# Patient Record
Sex: FEMALE | Race: BLACK OR AFRICAN AMERICAN | NOT HISPANIC OR LATINO | Employment: OTHER | URBAN - METROPOLITAN AREA
[De-identification: names, ages, dates, MRNs, and addresses within clinical notes are randomized per-mention and may not be internally consistent; named-entity substitution may affect disease eponyms.]

---

## 2017-01-01 ENCOUNTER — TELEPHONE (OUTPATIENT)
Dept: SLEEP MEDICINE | Facility: MEDICAL CENTER | Age: 72
End: 2017-01-01

## 2017-01-01 ENCOUNTER — PATIENT OUTREACH (OUTPATIENT)
Dept: HEALTH INFORMATION MANAGEMENT | Facility: OTHER | Age: 72
End: 2017-01-01

## 2017-01-01 ENCOUNTER — APPOINTMENT (OUTPATIENT)
Dept: RADIOLOGY | Facility: MEDICAL CENTER | Age: 72
DRG: 291 | End: 2017-01-01
Attending: INTERNAL MEDICINE
Payer: MEDICARE

## 2017-01-01 ENCOUNTER — TELEPHONE (OUTPATIENT)
Dept: MEDICAL GROUP | Facility: CLINIC | Age: 72
End: 2017-01-01

## 2017-01-01 ENCOUNTER — OFFICE VISIT (OUTPATIENT)
Dept: PULMONOLOGY | Facility: HOSPICE | Age: 72
End: 2017-01-01
Payer: MEDICARE

## 2017-01-01 ENCOUNTER — APPOINTMENT (OUTPATIENT)
Dept: RADIOLOGY | Facility: MEDICAL CENTER | Age: 72
DRG: 291 | End: 2017-01-01
Attending: EMERGENCY MEDICINE
Payer: MEDICARE

## 2017-01-01 ENCOUNTER — OFFICE VISIT (OUTPATIENT)
Dept: MEDICAL GROUP | Facility: CLINIC | Age: 72
End: 2017-01-01
Payer: MEDICARE

## 2017-01-01 ENCOUNTER — HOSPITAL ENCOUNTER (EMERGENCY)
Facility: MEDICAL CENTER | Age: 72
End: 2017-03-14
Attending: EMERGENCY MEDICINE
Payer: MEDICARE

## 2017-01-01 ENCOUNTER — TELEPHONE (OUTPATIENT)
Dept: CARDIOLOGY | Facility: MEDICAL CENTER | Age: 72
End: 2017-01-01

## 2017-01-01 ENCOUNTER — APPOINTMENT (OUTPATIENT)
Dept: PULMONOLOGY | Facility: HOSPICE | Age: 72
End: 2017-01-01
Payer: MEDICARE

## 2017-01-01 ENCOUNTER — TELEPHONE (OUTPATIENT)
Dept: PULMONOLOGY | Facility: HOSPICE | Age: 72
End: 2017-01-01

## 2017-01-01 ENCOUNTER — HOSPITAL ENCOUNTER (INPATIENT)
Facility: MEDICAL CENTER | Age: 72
LOS: 5 days | DRG: 291 | End: 2017-12-30
Attending: EMERGENCY MEDICINE | Admitting: HOSPITALIST
Payer: MEDICARE

## 2017-01-01 ENCOUNTER — HOME CARE VISIT (OUTPATIENT)
Dept: HOSPICE | Facility: HOSPICE | Age: 72
End: 2017-01-01
Payer: MEDICARE

## 2017-01-01 ENCOUNTER — HOSPICE ADMISSION (OUTPATIENT)
Dept: HOSPICE | Facility: HOSPICE | Age: 72
End: 2017-01-01
Payer: MEDICARE

## 2017-01-01 ENCOUNTER — RESOLUTE PROFESSIONAL BILLING HOSPITAL PROF FEE (OUTPATIENT)
Dept: HOSPITALIST | Facility: MEDICAL CENTER | Age: 72
End: 2017-01-01
Payer: MEDICARE

## 2017-01-01 ENCOUNTER — HOSPITAL ENCOUNTER (OUTPATIENT)
Dept: LAB | Facility: MEDICAL CENTER | Age: 72
End: 2017-02-28
Attending: PHYSICIAN ASSISTANT
Payer: MEDICARE

## 2017-01-01 ENCOUNTER — HOSPITAL ENCOUNTER (OUTPATIENT)
Dept: LAB | Facility: MEDICAL CENTER | Age: 72
End: 2017-03-22
Attending: PHYSICIAN ASSISTANT
Payer: MEDICARE

## 2017-01-01 ENCOUNTER — HOSPITAL ENCOUNTER (OUTPATIENT)
Facility: MEDICAL CENTER | Age: 72
End: 2017-03-23
Attending: FAMILY MEDICINE
Payer: MEDICARE

## 2017-01-01 ENCOUNTER — HOSPITAL ENCOUNTER (OUTPATIENT)
Dept: LAB | Facility: MEDICAL CENTER | Age: 72
End: 2017-06-01
Attending: FAMILY MEDICINE
Payer: MEDICARE

## 2017-01-01 ENCOUNTER — HOSPITAL ENCOUNTER (OUTPATIENT)
Dept: LAB | Facility: MEDICAL CENTER | Age: 72
End: 2017-04-24
Attending: INTERNAL MEDICINE
Payer: MEDICARE

## 2017-01-01 VITALS
OXYGEN SATURATION: 96 % | DIASTOLIC BLOOD PRESSURE: 90 MMHG | HEART RATE: 70 BPM | TEMPERATURE: 97.2 F | RESPIRATION RATE: 16 BRPM | HEIGHT: 67 IN | SYSTOLIC BLOOD PRESSURE: 140 MMHG

## 2017-01-01 VITALS
TEMPERATURE: 97.2 F | HEIGHT: 67 IN | HEART RATE: 62 BPM | RESPIRATION RATE: 16 BRPM | SYSTOLIC BLOOD PRESSURE: 130 MMHG | BODY MASS INDEX: 45.99 KG/M2 | DIASTOLIC BLOOD PRESSURE: 80 MMHG | OXYGEN SATURATION: 92 % | WEIGHT: 293 LBS

## 2017-01-01 VITALS
HEIGHT: 67 IN | DIASTOLIC BLOOD PRESSURE: 84 MMHG | HEART RATE: 74 BPM | SYSTOLIC BLOOD PRESSURE: 124 MMHG | WEIGHT: 293 LBS | RESPIRATION RATE: 14 BRPM | BODY MASS INDEX: 45.99 KG/M2

## 2017-01-01 VITALS
WEIGHT: 293 LBS | HEART RATE: 80 BPM | OXYGEN SATURATION: 88 % | HEIGHT: 67 IN | SYSTOLIC BLOOD PRESSURE: 126 MMHG | RESPIRATION RATE: 20 BRPM | TEMPERATURE: 97.2 F | DIASTOLIC BLOOD PRESSURE: 80 MMHG | BODY MASS INDEX: 45.99 KG/M2

## 2017-01-01 VITALS
TEMPERATURE: 98.6 F | HEIGHT: 67 IN | BODY MASS INDEX: 45.99 KG/M2 | WEIGHT: 293 LBS | OXYGEN SATURATION: 95 % | DIASTOLIC BLOOD PRESSURE: 71 MMHG | SYSTOLIC BLOOD PRESSURE: 144 MMHG | HEART RATE: 79 BPM | RESPIRATION RATE: 20 BRPM

## 2017-01-01 VITALS
OXYGEN SATURATION: 87 % | HEIGHT: 67 IN | BODY MASS INDEX: 45.99 KG/M2 | SYSTOLIC BLOOD PRESSURE: 130 MMHG | TEMPERATURE: 98.4 F | WEIGHT: 293 LBS | HEART RATE: 90 BPM | DIASTOLIC BLOOD PRESSURE: 80 MMHG | RESPIRATION RATE: 16 BRPM

## 2017-01-01 VITALS
DIASTOLIC BLOOD PRESSURE: 83 MMHG | SYSTOLIC BLOOD PRESSURE: 116 MMHG | WEIGHT: 293 LBS | RESPIRATION RATE: 16 BRPM | OXYGEN SATURATION: 97 % | TEMPERATURE: 97.3 F | HEART RATE: 67 BPM | HEIGHT: 67 IN | BODY MASS INDEX: 45.99 KG/M2

## 2017-01-01 DIAGNOSIS — E66.01 MORBID OBESITY WITH BMI OF 60.0-69.9, ADULT (HCC): ICD-10-CM

## 2017-01-01 DIAGNOSIS — J44.89 COPD WITH ASTHMA: Chronic | ICD-10-CM

## 2017-01-01 DIAGNOSIS — Z99.81 OXYGEN DEPENDENT: ICD-10-CM

## 2017-01-01 DIAGNOSIS — I27.20 PULMONARY HTN (HCC): Chronic | ICD-10-CM

## 2017-01-01 DIAGNOSIS — I50.40 COMBINED SYSTOLIC AND DIASTOLIC CONGESTIVE HEART FAILURE, NYHA CLASS 3 (HCC): ICD-10-CM

## 2017-01-01 DIAGNOSIS — M25.572 CHRONIC PAIN OF BOTH ANKLES: ICD-10-CM

## 2017-01-01 DIAGNOSIS — J44.1 COPD WITH ACUTE EXACERBATION (HCC): ICD-10-CM

## 2017-01-01 DIAGNOSIS — G89.29 CHRONIC PAIN OF BOTH ANKLES: ICD-10-CM

## 2017-01-01 DIAGNOSIS — I10 ESSENTIAL HYPERTENSION: Chronic | ICD-10-CM

## 2017-01-01 DIAGNOSIS — M25.571 CHRONIC PAIN OF BOTH ANKLES: ICD-10-CM

## 2017-01-01 DIAGNOSIS — J96.01 ACUTE HYPOXEMIC RESPIRATORY FAILURE (HCC): ICD-10-CM

## 2017-01-01 DIAGNOSIS — E87.5 HYPERKALEMIA: ICD-10-CM

## 2017-01-01 DIAGNOSIS — R04.0 EPISTAXIS: ICD-10-CM

## 2017-01-01 DIAGNOSIS — I48.20 CHRONIC ATRIAL FIBRILLATION (HCC): Chronic | ICD-10-CM

## 2017-01-01 DIAGNOSIS — G47.33 OBSTRUCTIVE SLEEP APNEA: ICD-10-CM

## 2017-01-01 DIAGNOSIS — I50.9 CONGESTIVE HEART FAILURE, UNSPECIFIED CONGESTIVE HEART FAILURE CHRONICITY, UNSPECIFIED CONGESTIVE HEART FAILURE TYPE: ICD-10-CM

## 2017-01-01 DIAGNOSIS — E66.01 MORBID OBESITY WITH BMI OF 50.0-59.9, ADULT (HCC): ICD-10-CM

## 2017-01-01 DIAGNOSIS — M79.604 BILATERAL LEG AND FOOT PAIN: ICD-10-CM

## 2017-01-01 DIAGNOSIS — M79.672 BILATERAL LEG AND FOOT PAIN: ICD-10-CM

## 2017-01-01 DIAGNOSIS — M79.671 BILATERAL LEG AND FOOT PAIN: ICD-10-CM

## 2017-01-01 DIAGNOSIS — Z12.11 COLON CANCER SCREENING: ICD-10-CM

## 2017-01-01 DIAGNOSIS — J44.9 CHRONIC OBSTRUCTIVE PULMONARY DISEASE, UNSPECIFIED COPD TYPE (HCC): ICD-10-CM

## 2017-01-01 DIAGNOSIS — M79.605 BILATERAL LEG AND FOOT PAIN: ICD-10-CM

## 2017-01-01 DIAGNOSIS — M77.50 BONE SPUR OF FOOT: ICD-10-CM

## 2017-01-01 DIAGNOSIS — Z74.09 IMPAIRED MOBILITY AND ACTIVITIES OF DAILY LIVING: ICD-10-CM

## 2017-01-01 DIAGNOSIS — I10 ESSENTIAL HYPERTENSION: ICD-10-CM

## 2017-01-01 DIAGNOSIS — R06.02 SHORTNESS OF BREATH: ICD-10-CM

## 2017-01-01 DIAGNOSIS — N18.30 CHRONIC KIDNEY DISEASE (CKD), STAGE III (MODERATE) (HCC): Chronic | ICD-10-CM

## 2017-01-01 DIAGNOSIS — Z78.9 IMPAIRED MOBILITY AND ACTIVITIES OF DAILY LIVING: ICD-10-CM

## 2017-01-01 DIAGNOSIS — I50.9 CHRONIC CONGESTIVE HEART FAILURE, UNSPECIFIED CONGESTIVE HEART FAILURE TYPE: ICD-10-CM

## 2017-01-01 DIAGNOSIS — J45.909 UNCOMPLICATED ASTHMA, UNSPECIFIED ASTHMA SEVERITY: ICD-10-CM

## 2017-01-01 DIAGNOSIS — Z23 NEED FOR PNEUMOCOCCAL VACCINATION: ICD-10-CM

## 2017-01-01 DIAGNOSIS — J96.11 CHRONIC RESPIRATORY FAILURE WITH HYPOXIA (HCC): ICD-10-CM

## 2017-01-01 DIAGNOSIS — E78.5 HYPERLIPIDEMIA, UNSPECIFIED HYPERLIPIDEMIA TYPE: Chronic | ICD-10-CM

## 2017-01-01 DIAGNOSIS — Z12.31 ENCOUNTER FOR SCREENING MAMMOGRAM FOR BREAST CANCER: ICD-10-CM

## 2017-01-01 LAB
ALBUMIN SERPL BCP-MCNC: 3.1 G/DL (ref 3.2–4.9)
ALBUMIN SERPL BCP-MCNC: 3.2 G/DL (ref 3.2–4.9)
ALBUMIN SERPL BCP-MCNC: 3.4 G/DL (ref 3.2–4.9)
ALBUMIN/GLOB SERPL: 0.8 G/DL
ALBUMIN/GLOB SERPL: 0.8 G/DL
ALBUMIN/GLOB SERPL: 0.9 G/DL
ALP SERPL-CCNC: 152 U/L (ref 30–99)
ALP SERPL-CCNC: 157 U/L (ref 30–99)
ALP SERPL-CCNC: 164 U/L (ref 30–99)
ALT SERPL-CCNC: 8 U/L (ref 2–50)
ALT SERPL-CCNC: 8 U/L (ref 2–50)
ALT SERPL-CCNC: 9 U/L (ref 2–50)
ANION GAP SERPL CALC-SCNC: 10 MMOL/L (ref 0–11.9)
ANION GAP SERPL CALC-SCNC: 4 MMOL/L (ref 0–11.9)
ANION GAP SERPL CALC-SCNC: 4 MMOL/L (ref 0–11.9)
ANION GAP SERPL CALC-SCNC: 5 MMOL/L (ref 0–11.9)
ANION GAP SERPL CALC-SCNC: 5 MMOL/L (ref 0–11.9)
ANION GAP SERPL CALC-SCNC: 7 MMOL/L (ref 0–11.9)
ANION GAP SERPL CALC-SCNC: 7 MMOL/L (ref 0–11.9)
ANION GAP SERPL CALC-SCNC: 8 MMOL/L (ref 0–11.9)
ANION GAP SERPL CALC-SCNC: 8 MMOL/L (ref 0–11.9)
ANION GAP SERPL CALC-SCNC: 9 MMOL/L (ref 0–11.9)
ANION GAP SERPL CALC-SCNC: 9 MMOL/L (ref 0–11.9)
APTT PPP: 33 SEC (ref 24.7–36)
AST SERPL-CCNC: 21 U/L (ref 12–45)
AST SERPL-CCNC: 23 U/L (ref 12–45)
AST SERPL-CCNC: 24 U/L (ref 12–45)
BACTERIA BLD CULT: NORMAL
BACTERIA BLD CULT: NORMAL
BASE EXCESS BLDA CALC-SCNC: 5 MMOL/L (ref -4–3)
BASE EXCESS BLDA CALC-SCNC: 5 MMOL/L (ref -4–3)
BASOPHILS # BLD AUTO: 0 % (ref 0–1.8)
BASOPHILS # BLD AUTO: 0.3 % (ref 0–1.8)
BASOPHILS # BLD AUTO: 0.4 % (ref 0–1.8)
BASOPHILS # BLD: 0 K/UL (ref 0–0.12)
BASOPHILS # BLD: 0.02 K/UL (ref 0–0.12)
BASOPHILS # BLD: 0.02 K/UL (ref 0–0.12)
BILIRUB SERPL-MCNC: 1.6 MG/DL (ref 0.1–1.5)
BILIRUB SERPL-MCNC: 1.6 MG/DL (ref 0.1–1.5)
BILIRUB SERPL-MCNC: 2.2 MG/DL (ref 0.1–1.5)
BNP SERPL-MCNC: 1188 PG/ML (ref 0–100)
BNP SERPL-MCNC: 2105 PG/ML (ref 0–100)
BNP SERPL-MCNC: 669 PG/ML (ref 0–100)
BNP SERPL-MCNC: 713 PG/ML (ref 0–100)
BNP SERPL-MCNC: 977 PG/ML (ref 0–100)
BODY TEMPERATURE: ABNORMAL CENTIGRADE
BODY TEMPERATURE: ABNORMAL CENTIGRADE
BUN SERPL-MCNC: 36 MG/DL (ref 8–22)
BUN SERPL-MCNC: 37 MG/DL (ref 8–22)
BUN SERPL-MCNC: 38 MG/DL (ref 8–22)
BUN SERPL-MCNC: 40 MG/DL (ref 8–22)
BUN SERPL-MCNC: 44 MG/DL (ref 8–22)
BUN SERPL-MCNC: 48 MG/DL (ref 8–22)
BUN SERPL-MCNC: 48 MG/DL (ref 8–22)
BUN SERPL-MCNC: 54 MG/DL (ref 8–22)
BUN SERPL-MCNC: 59 MG/DL (ref 8–22)
CALCIUM SERPL-MCNC: 10.1 MG/DL (ref 8.5–10.5)
CALCIUM SERPL-MCNC: 10.2 MG/DL (ref 8.5–10.5)
CALCIUM SERPL-MCNC: 10.2 MG/DL (ref 8.5–10.5)
CALCIUM SERPL-MCNC: 10.5 MG/DL (ref 8.5–10.5)
CALCIUM SERPL-MCNC: 10.5 MG/DL (ref 8.5–10.5)
CALCIUM SERPL-MCNC: 9.5 MG/DL (ref 8.5–10.5)
CALCIUM SERPL-MCNC: 9.8 MG/DL (ref 8.5–10.5)
CALCIUM SERPL-MCNC: 9.8 MG/DL (ref 8.5–10.5)
CHLORIDE SERPL-SCNC: 100 MMOL/L (ref 96–112)
CHLORIDE SERPL-SCNC: 102 MMOL/L (ref 96–112)
CHLORIDE SERPL-SCNC: 102 MMOL/L (ref 96–112)
CHLORIDE SERPL-SCNC: 103 MMOL/L (ref 96–112)
CHLORIDE SERPL-SCNC: 103 MMOL/L (ref 96–112)
CHLORIDE SERPL-SCNC: 105 MMOL/L (ref 96–112)
CHLORIDE SERPL-SCNC: 107 MMOL/L (ref 96–112)
CHLORIDE SERPL-SCNC: 99 MMOL/L (ref 96–112)
CHLORIDE SERPL-SCNC: 99 MMOL/L (ref 96–112)
CO2 SERPL-SCNC: 26 MMOL/L (ref 20–33)
CO2 SERPL-SCNC: 27 MMOL/L (ref 20–33)
CO2 SERPL-SCNC: 28 MMOL/L (ref 20–33)
CO2 SERPL-SCNC: 29 MMOL/L (ref 20–33)
CO2 SERPL-SCNC: 29 MMOL/L (ref 20–33)
CO2 SERPL-SCNC: 31 MMOL/L (ref 20–33)
CO2 SERPL-SCNC: 32 MMOL/L (ref 20–33)
CO2 SERPL-SCNC: 32 MMOL/L (ref 20–33)
CO2 SERPL-SCNC: 33 MMOL/L (ref 20–33)
CO2 SERPL-SCNC: 33 MMOL/L (ref 20–33)
CO2 SERPL-SCNC: 34 MMOL/L (ref 20–33)
CREAT SERPL-MCNC: 1.21 MG/DL (ref 0.5–1.4)
CREAT SERPL-MCNC: 1.33 MG/DL (ref 0.5–1.4)
CREAT SERPL-MCNC: 1.45 MG/DL (ref 0.5–1.4)
CREAT SERPL-MCNC: 1.46 MG/DL (ref 0.5–1.4)
CREAT SERPL-MCNC: 1.48 MG/DL (ref 0.5–1.4)
CREAT SERPL-MCNC: 1.52 MG/DL (ref 0.5–1.4)
CREAT SERPL-MCNC: 1.55 MG/DL (ref 0.5–1.4)
CREAT SERPL-MCNC: 1.82 MG/DL (ref 0.5–1.4)
CREAT SERPL-MCNC: 1.91 MG/DL (ref 0.5–1.4)
CREAT SERPL-MCNC: 2.07 MG/DL (ref 0.5–1.4)
CREAT SERPL-MCNC: 2.33 MG/DL (ref 0.5–1.4)
DEPRECATED D DIMER PPP IA-ACNC: 2751 NG/ML(D-DU)
EKG IMPRESSION: NORMAL
EOSINOPHIL # BLD AUTO: 0 K/UL (ref 0–0.51)
EOSINOPHIL # BLD AUTO: 0.18 K/UL (ref 0–0.51)
EOSINOPHIL # BLD AUTO: 0.22 K/UL (ref 0–0.51)
EOSINOPHIL NFR BLD: 0 % (ref 0–6.9)
EOSINOPHIL NFR BLD: 2.9 % (ref 0–6.9)
EOSINOPHIL NFR BLD: 4.6 % (ref 0–6.9)
ERYTHROCYTE [DISTWIDTH] IN BLOOD BY AUTOMATED COUNT: 59.2 FL (ref 35.9–50)
ERYTHROCYTE [DISTWIDTH] IN BLOOD BY AUTOMATED COUNT: 61.4 FL (ref 35.9–50)
ERYTHROCYTE [DISTWIDTH] IN BLOOD BY AUTOMATED COUNT: 63.3 FL (ref 35.9–50)
FLUAV RNA SPEC QL NAA+PROBE: NEGATIVE
FLUBV RNA SPEC QL NAA+PROBE: NEGATIVE
GFR SERPL CREATININE-BSD FRML MDRD: 21 ML/MIN/1.73 M 2
GFR SERPL CREATININE-BSD FRML MDRD: 24 ML/MIN/1.73 M 2
GFR SERPL CREATININE-BSD FRML MDRD: 26 ML/MIN/1.73 M 2
GFR SERPL CREATININE-BSD FRML MDRD: 27 ML/MIN/1.73 M 2
GFR SERPL CREATININE-BSD FRML MDRD: 33 ML/MIN/1.73 M 2
GFR SERPL CREATININE-BSD FRML MDRD: 34 ML/MIN/1.73 M 2
GFR SERPL CREATININE-BSD FRML MDRD: 35 ML/MIN/1.73 M 2
GFR SERPL CREATININE-BSD FRML MDRD: 35 ML/MIN/1.73 M 2
GFR SERPL CREATININE-BSD FRML MDRD: 44 ML/MIN/1.73 M 2
GLOBULIN SER CALC-MCNC: 3.5 G/DL (ref 1.9–3.5)
GLOBULIN SER CALC-MCNC: 4.1 G/DL (ref 1.9–3.5)
GLOBULIN SER CALC-MCNC: 4.4 G/DL (ref 1.9–3.5)
GLUCOSE SERPL-MCNC: 100 MG/DL (ref 65–99)
GLUCOSE SERPL-MCNC: 100 MG/DL (ref 65–99)
GLUCOSE SERPL-MCNC: 104 MG/DL (ref 65–99)
GLUCOSE SERPL-MCNC: 117 MG/DL (ref 65–99)
GLUCOSE SERPL-MCNC: 118 MG/DL (ref 65–99)
GLUCOSE SERPL-MCNC: 125 MG/DL (ref 65–99)
GLUCOSE SERPL-MCNC: 168 MG/DL (ref 65–99)
GLUCOSE SERPL-MCNC: 178 MG/DL (ref 65–99)
GLUCOSE SERPL-MCNC: 82 MG/DL (ref 65–99)
GLUCOSE SERPL-MCNC: 86 MG/DL (ref 65–99)
GLUCOSE SERPL-MCNC: 95 MG/DL (ref 65–99)
HCO3 BLDA-SCNC: 33 MMOL/L (ref 17–25)
HCO3 BLDA-SCNC: 33 MMOL/L (ref 17–25)
HCT VFR BLD AUTO: 39.5 % (ref 37–47)
HCT VFR BLD AUTO: 41.8 % (ref 37–47)
HCT VFR BLD AUTO: 46.3 % (ref 37–47)
HEMOCCULT STL QL IA: NEGATIVE
HGB BLD-MCNC: 12.5 G/DL (ref 12–16)
HGB BLD-MCNC: 13.1 G/DL (ref 12–16)
HGB BLD-MCNC: 14.3 G/DL (ref 12–16)
IMM GRANULOCYTES # BLD AUTO: 0.01 K/UL (ref 0–0.11)
IMM GRANULOCYTES # BLD AUTO: 0.02 K/UL (ref 0–0.11)
IMM GRANULOCYTES # BLD AUTO: 0.02 K/UL (ref 0–0.11)
IMM GRANULOCYTES NFR BLD AUTO: 0.2 % (ref 0–0.9)
IMM GRANULOCYTES NFR BLD AUTO: 0.3 % (ref 0–0.9)
IMM GRANULOCYTES NFR BLD AUTO: 0.5 % (ref 0–0.9)
INHALED O2 FLOW RATE: 15 L/MIN (ref 2–10)
INHALED O2 FLOW RATE: 60 L/MIN (ref 2–10)
INR PPP: 1.32 (ref 0.87–1.13)
LACTATE BLD-SCNC: 1.9 MMOL/L (ref 0.5–2)
LV EJECT FRACT  99904: 55
LV EJECT FRACT MOD 2C 99903: 38.96
LV EJECT FRACT MOD 4C 99902: 58.74
LV EJECT FRACT MOD BP 99901: 51.27
LYMPHOCYTES # BLD AUTO: 0.36 K/UL (ref 1–4.8)
LYMPHOCYTES # BLD AUTO: 0.84 K/UL (ref 1–4.8)
LYMPHOCYTES # BLD AUTO: 0.96 K/UL (ref 1–4.8)
LYMPHOCYTES NFR BLD: 13.4 % (ref 22–41)
LYMPHOCYTES NFR BLD: 19.9 % (ref 22–41)
LYMPHOCYTES NFR BLD: 8.6 % (ref 22–41)
MCH RBC QN AUTO: 29.8 PG (ref 27–33)
MCHC RBC AUTO-ENTMCNC: 30.9 G/DL (ref 33.6–35)
MCHC RBC AUTO-ENTMCNC: 31.3 G/DL (ref 33.6–35)
MCHC RBC AUTO-ENTMCNC: 31.6 G/DL (ref 33.6–35)
MCV RBC AUTO: 94.3 FL (ref 81.4–97.8)
MCV RBC AUTO: 95 FL (ref 81.4–97.8)
MCV RBC AUTO: 96.5 FL (ref 81.4–97.8)
MONOCYTES # BLD AUTO: 0.25 K/UL (ref 0–0.85)
MONOCYTES # BLD AUTO: 0.83 K/UL (ref 0–0.85)
MONOCYTES # BLD AUTO: 0.92 K/UL (ref 0–0.85)
MONOCYTES NFR BLD AUTO: 14.7 % (ref 0–13.4)
MONOCYTES NFR BLD AUTO: 17.2 % (ref 0–13.4)
MONOCYTES NFR BLD AUTO: 5.9 % (ref 0–13.4)
NEUTROPHILS # BLD AUTO: 2.79 K/UL (ref 2–7.15)
NEUTROPHILS # BLD AUTO: 3.58 K/UL (ref 2–7.15)
NEUTROPHILS # BLD AUTO: 4.27 K/UL (ref 2–7.15)
NEUTROPHILS NFR BLD: 57.7 % (ref 44–72)
NEUTROPHILS NFR BLD: 68.4 % (ref 44–72)
NEUTROPHILS NFR BLD: 85 % (ref 44–72)
NRBC # BLD AUTO: 0 K/UL
NRBC # BLD AUTO: 0 K/UL
NRBC # BLD AUTO: 0.04 K/UL
NRBC BLD-RTO: 0 /100 WBC
NRBC BLD-RTO: 0 /100 WBC
NRBC BLD-RTO: 1 /100 WBC
PCO2 BLDA: 60.8 MMHG (ref 26–37)
PCO2 BLDA: 65.1 MMHG (ref 26–37)
PH BLDA: 7.32 [PH] (ref 7.4–7.5)
PH BLDA: 7.35 [PH] (ref 7.4–7.5)
PLATELET # BLD AUTO: 233 K/UL (ref 164–446)
PLATELET # BLD AUTO: 249 K/UL (ref 164–446)
PLATELET # BLD AUTO: 254 K/UL (ref 164–446)
PMV BLD AUTO: 10.5 FL (ref 9–12.9)
PMV BLD AUTO: 11.2 FL (ref 9–12.9)
PMV BLD AUTO: 11.2 FL (ref 9–12.9)
PO2 BLDA: 80.8 MMHG (ref 64–87)
PO2 BLDA: 92 MMHG (ref 64–87)
POTASSIUM SERPL-SCNC: 4.1 MMOL/L (ref 3.6–5.5)
POTASSIUM SERPL-SCNC: 4.2 MMOL/L (ref 3.6–5.5)
POTASSIUM SERPL-SCNC: 4.9 MMOL/L (ref 3.6–5.5)
POTASSIUM SERPL-SCNC: 5 MMOL/L (ref 3.6–5.5)
POTASSIUM SERPL-SCNC: 5.1 MMOL/L (ref 3.6–5.5)
POTASSIUM SERPL-SCNC: 5.4 MMOL/L (ref 3.6–5.5)
POTASSIUM SERPL-SCNC: 6.3 MMOL/L (ref 3.6–5.5)
POTASSIUM SERPL-SCNC: 6.5 MMOL/L (ref 3.6–5.5)
POTASSIUM SERPL-SCNC: 6.6 MMOL/L (ref 3.6–5.5)
POTASSIUM SERPL-SCNC: 6.7 MMOL/L (ref 3.6–5.5)
PROCALCITONIN SERPL-MCNC: 0.2 NG/ML
PROT SERPL-MCNC: 6.6 G/DL (ref 6–8.2)
PROT SERPL-MCNC: 7.3 G/DL (ref 6–8.2)
PROT SERPL-MCNC: 7.8 G/DL (ref 6–8.2)
PROTHROMBIN TIME: 16.1 SEC (ref 12–14.6)
RBC # BLD AUTO: 4.19 M/UL (ref 4.2–5.4)
RBC # BLD AUTO: 4.4 M/UL (ref 4.2–5.4)
RBC # BLD AUTO: 4.8 M/UL (ref 4.2–5.4)
SAO2 % BLDA: 95 % (ref 93–99)
SAO2 % BLDA: 96.7 % (ref 93–99)
SIGNIFICANT IND 70042: NORMAL
SIGNIFICANT IND 70042: NORMAL
SITE SITE: NORMAL
SITE SITE: NORMAL
SODIUM SERPL-SCNC: 136 MMOL/L (ref 135–145)
SODIUM SERPL-SCNC: 138 MMOL/L (ref 135–145)
SODIUM SERPL-SCNC: 139 MMOL/L (ref 135–145)
SODIUM SERPL-SCNC: 139 MMOL/L (ref 135–145)
SODIUM SERPL-SCNC: 140 MMOL/L (ref 135–145)
SODIUM SERPL-SCNC: 141 MMOL/L (ref 135–145)
SODIUM SERPL-SCNC: 141 MMOL/L (ref 135–145)
SOURCE SOURCE: NORMAL
SOURCE SOURCE: NORMAL
TROPONIN I SERPL-MCNC: 0.02 NG/ML (ref 0–0.04)
TROPONIN I SERPL-MCNC: 0.03 NG/ML (ref 0–0.04)
WBC # BLD AUTO: 4.2 K/UL (ref 4.8–10.8)
WBC # BLD AUTO: 4.8 K/UL (ref 4.8–10.8)
WBC # BLD AUTO: 6.3 K/UL (ref 4.8–10.8)

## 2017-01-01 PROCEDURE — 99285 EMERGENCY DEPT VISIT HI MDM: CPT

## 2017-01-01 PROCEDURE — 83880 ASSAY OF NATRIURETIC PEPTIDE: CPT | Mod: GA

## 2017-01-01 PROCEDURE — 770020 HCHG ROOM/CARE - TELE (206)

## 2017-01-01 PROCEDURE — 3014F SCREEN MAMMO DOC REV: CPT | Performed by: NURSE PRACTITIONER

## 2017-01-01 PROCEDURE — 3017F COLORECTAL CA SCREEN DOC REV: CPT | Performed by: NURSE PRACTITIONER

## 2017-01-01 PROCEDURE — 84484 ASSAY OF TROPONIN QUANT: CPT

## 2017-01-01 PROCEDURE — G0009 ADMIN PNEUMOCOCCAL VACCINE: HCPCS | Mod: GV | Performed by: FAMILY MEDICINE

## 2017-01-01 PROCEDURE — 93010 ELECTROCARDIOGRAM REPORT: CPT | Performed by: INTERNAL MEDICINE

## 2017-01-01 PROCEDURE — A9270 NON-COVERED ITEM OR SERVICE: HCPCS | Performed by: INTERNAL MEDICINE

## 2017-01-01 PROCEDURE — 80048 BASIC METABOLIC PNL TOTAL CA: CPT

## 2017-01-01 PROCEDURE — 94640 AIRWAY INHALATION TREATMENT: CPT

## 2017-01-01 PROCEDURE — G8419 CALC BMI OUT NRM PARAM NOF/U: HCPCS | Performed by: FAMILY MEDICINE

## 2017-01-01 PROCEDURE — 36415 COLL VENOUS BLD VENIPUNCTURE: CPT

## 2017-01-01 PROCEDURE — 85730 THROMBOPLASTIN TIME PARTIAL: CPT

## 2017-01-01 PROCEDURE — 99233 SBSQ HOSP IP/OBS HIGH 50: CPT | Performed by: INTERNAL MEDICINE

## 2017-01-01 PROCEDURE — 90732 PPSV23 VACC 2 YRS+ SUBQ/IM: CPT | Mod: GV | Performed by: FAMILY MEDICINE

## 2017-01-01 PROCEDURE — 99223 1ST HOSP IP/OBS HIGH 75: CPT | Performed by: HOSPITALIST

## 2017-01-01 PROCEDURE — 84145 PROCALCITONIN (PCT): CPT

## 2017-01-01 PROCEDURE — 99292 CRITICAL CARE ADDL 30 MIN: CPT | Performed by: INTERNAL MEDICINE

## 2017-01-01 PROCEDURE — 1036F TOBACCO NON-USER: CPT | Performed by: FAMILY MEDICINE

## 2017-01-01 PROCEDURE — 700111 HCHG RX REV CODE 636 W/ 250 OVERRIDE (IP): Performed by: INTERNAL MEDICINE

## 2017-01-01 PROCEDURE — 36415 COLL VENOUS BLD VENIPUNCTURE: CPT | Mod: GA

## 2017-01-01 PROCEDURE — 99291 CRITICAL CARE FIRST HOUR: CPT | Mod: 25 | Performed by: INTERNAL MEDICINE

## 2017-01-01 PROCEDURE — 700111 HCHG RX REV CODE 636 W/ 250 OVERRIDE (IP): Performed by: HOSPITALIST

## 2017-01-01 PROCEDURE — 99214 OFFICE O/P EST MOD 30 MIN: CPT | Performed by: FAMILY MEDICINE

## 2017-01-01 PROCEDURE — 93005 ELECTROCARDIOGRAM TRACING: CPT

## 2017-01-01 PROCEDURE — 304561 HCHG STAT O2

## 2017-01-01 PROCEDURE — 83605 ASSAY OF LACTIC ACID: CPT

## 2017-01-01 PROCEDURE — 700101 HCHG RX REV CODE 250: Performed by: INTERNAL MEDICINE

## 2017-01-01 PROCEDURE — 3014F SCREEN MAMMO DOC REV: CPT | Performed by: FAMILY MEDICINE

## 2017-01-01 PROCEDURE — A9270 NON-COVERED ITEM OR SERVICE: HCPCS | Performed by: EMERGENCY MEDICINE

## 2017-01-01 PROCEDURE — 83880 ASSAY OF NATRIURETIC PEPTIDE: CPT

## 2017-01-01 PROCEDURE — 82274 ASSAY TEST FOR BLOOD FECAL: CPT

## 2017-01-01 PROCEDURE — 700102 HCHG RX REV CODE 250 W/ 637 OVERRIDE(OP): Performed by: STUDENT IN AN ORGANIZED HEALTH CARE EDUCATION/TRAINING PROGRAM

## 2017-01-01 PROCEDURE — 700102 HCHG RX REV CODE 250 W/ 637 OVERRIDE(OP): Performed by: EMERGENCY MEDICINE

## 2017-01-01 PROCEDURE — 84484 ASSAY OF TROPONIN QUANT: CPT | Mod: 91

## 2017-01-01 PROCEDURE — 700102 HCHG RX REV CODE 250 W/ 637 OVERRIDE(OP): Performed by: INTERNAL MEDICINE

## 2017-01-01 PROCEDURE — 82803 BLOOD GASES ANY COMBINATION: CPT

## 2017-01-01 PROCEDURE — G8432 DEP SCR NOT DOC, RNG: HCPCS | Performed by: NURSE PRACTITIONER

## 2017-01-01 PROCEDURE — 1036F TOBACCO NON-USER: CPT | Performed by: NURSE PRACTITIONER

## 2017-01-01 PROCEDURE — 93306 TTE W/DOPPLER COMPLETE: CPT | Mod: 26 | Performed by: INTERNAL MEDICINE

## 2017-01-01 PROCEDURE — G8419 CALC BMI OUT NRM PARAM NOF/U: HCPCS | Performed by: NURSE PRACTITIONER

## 2017-01-01 PROCEDURE — 85610 PROTHROMBIN TIME: CPT

## 2017-01-01 PROCEDURE — 3017F COLORECTAL CA SCREEN DOC REV: CPT | Performed by: FAMILY MEDICINE

## 2017-01-01 PROCEDURE — 99291 CRITICAL CARE FIRST HOUR: CPT | Performed by: INTERNAL MEDICINE

## 2017-01-01 PROCEDURE — 700102 HCHG RX REV CODE 250 W/ 637 OVERRIDE(OP): Performed by: HOSPITALIST

## 2017-01-01 PROCEDURE — 93306 TTE W/DOPPLER COMPLETE: CPT

## 2017-01-01 PROCEDURE — 93005 ELECTROCARDIOGRAM TRACING: CPT | Performed by: STUDENT IN AN ORGANIZED HEALTH CARE EDUCATION/TRAINING PROGRAM

## 2017-01-01 PROCEDURE — G8482 FLU IMMUNIZE ORDER/ADMIN: HCPCS | Performed by: NURSE PRACTITIONER

## 2017-01-01 PROCEDURE — 770001 HCHG ROOM/CARE - MED/SURG/GYN PRIV*

## 2017-01-01 PROCEDURE — 93005 ELECTROCARDIOGRAM TRACING: CPT | Performed by: EMERGENCY MEDICINE

## 2017-01-01 PROCEDURE — 85379 FIBRIN DEGRADATION QUANT: CPT

## 2017-01-01 PROCEDURE — A9270 NON-COVERED ITEM OR SERVICE: HCPCS | Performed by: HOSPITALIST

## 2017-01-01 PROCEDURE — 700101 HCHG RX REV CODE 250: Performed by: STUDENT IN AN ORGANIZED HEALTH CARE EDUCATION/TRAINING PROGRAM

## 2017-01-01 PROCEDURE — 99214 OFFICE O/P EST MOD 30 MIN: CPT | Performed by: NURSE PRACTITIONER

## 2017-01-01 PROCEDURE — 80053 COMPREHEN METABOLIC PANEL: CPT

## 2017-01-01 PROCEDURE — 71010 DX-CHEST-PORTABLE (1 VIEW): CPT

## 2017-01-01 PROCEDURE — 87040 BLOOD CULTURE FOR BACTERIA: CPT | Mod: 91

## 2017-01-01 PROCEDURE — 93005 ELECTROCARDIOGRAM TRACING: CPT | Performed by: HOSPITALIST

## 2017-01-01 PROCEDURE — G8482 FLU IMMUNIZE ORDER/ADMIN: HCPCS | Performed by: FAMILY MEDICINE

## 2017-01-01 PROCEDURE — 99239 HOSP IP/OBS DSCHRG MGMT >30: CPT | Performed by: INTERNAL MEDICINE

## 2017-01-01 PROCEDURE — 1101F PT FALLS ASSESS-DOCD LE1/YR: CPT | Performed by: FAMILY MEDICINE

## 2017-01-01 PROCEDURE — 84132 ASSAY OF SERUM POTASSIUM: CPT

## 2017-01-01 PROCEDURE — 4040F PNEUMOC VAC/ADMIN/RCVD: CPT | Performed by: FAMILY MEDICINE

## 2017-01-01 PROCEDURE — 85025 COMPLETE CBC W/AUTO DIFF WBC: CPT

## 2017-01-01 PROCEDURE — 4040F PNEUMOC VAC/ADMIN/RCVD: CPT | Performed by: NURSE PRACTITIONER

## 2017-01-01 PROCEDURE — 94660 CPAP INITIATION&MGMT: CPT

## 2017-01-01 PROCEDURE — 99213 OFFICE O/P EST LOW 20 MIN: CPT | Performed by: FAMILY MEDICINE

## 2017-01-01 PROCEDURE — 94760 N-INVAS EAR/PLS OXIMETRY 1: CPT

## 2017-01-01 PROCEDURE — 700101 HCHG RX REV CODE 250: Performed by: EMERGENCY MEDICINE

## 2017-01-01 PROCEDURE — G8996 SWALLOW CURRENT STATUS: HCPCS | Mod: CK

## 2017-01-01 PROCEDURE — A9270 NON-COVERED ITEM OR SERVICE: HCPCS | Performed by: STUDENT IN AN ORGANIZED HEALTH CARE EDUCATION/TRAINING PROGRAM

## 2017-01-01 PROCEDURE — 93970 EXTREMITY STUDY: CPT

## 2017-01-01 PROCEDURE — 87502 INFLUENZA DNA AMP PROBE: CPT

## 2017-01-01 PROCEDURE — 99213 OFFICE O/P EST LOW 20 MIN: CPT | Mod: 25,GV | Performed by: FAMILY MEDICINE

## 2017-01-01 PROCEDURE — G8432 DEP SCR NOT DOC, RNG: HCPCS | Performed by: FAMILY MEDICINE

## 2017-01-01 PROCEDURE — 700105 HCHG RX REV CODE 258: Performed by: STUDENT IN AN ORGANIZED HEALTH CARE EDUCATION/TRAINING PROGRAM

## 2017-01-01 PROCEDURE — 700111 HCHG RX REV CODE 636 W/ 250 OVERRIDE (IP): Performed by: STUDENT IN AN ORGANIZED HEALTH CARE EDUCATION/TRAINING PROGRAM

## 2017-01-01 PROCEDURE — 99283 EMERGENCY DEPT VISIT LOW MDM: CPT

## 2017-01-01 PROCEDURE — 304562 HCHG STAT O2 MASK/CANNULA

## 2017-01-01 PROCEDURE — 92610 EVALUATE SWALLOWING FUNCTION: CPT

## 2017-01-01 PROCEDURE — G8997 SWALLOW GOAL STATUS: HCPCS | Mod: CH

## 2017-01-01 PROCEDURE — 303620 HCHG EPISTAXIS CONTROL

## 2017-01-01 PROCEDURE — 1101F PT FALLS ASSESS-DOCD LE1/YR: CPT | Performed by: NURSE PRACTITIONER

## 2017-01-01 RX ORDER — MORPHINE SULFATE 4 MG/ML
1 INJECTION, SOLUTION INTRAMUSCULAR; INTRAVENOUS EVERY 6 HOURS PRN
Status: DISCONTINUED | OUTPATIENT
Start: 2017-01-01 | End: 2017-12-31 | Stop reason: HOSPADM

## 2017-01-01 RX ORDER — BENZONATATE 100 MG/1
100 CAPSULE ORAL 3 TIMES DAILY PRN
Qty: 120 CAP | Refills: 2 | Status: SHIPPED | OUTPATIENT
Start: 2017-01-01

## 2017-01-01 RX ORDER — HYDRALAZINE HYDROCHLORIDE 10 MG/1
10 TABLET, FILM COATED ORAL EVERY 8 HOURS
Status: DISCONTINUED | OUTPATIENT
Start: 2017-01-01 | End: 2017-01-01

## 2017-01-01 RX ORDER — LEVALBUTEROL INHALATION SOLUTION 1.25 MG/3ML
1.25 SOLUTION RESPIRATORY (INHALATION) EVERY 4 HOURS PRN
Qty: 270 ML | Refills: 5 | Status: SHIPPED | OUTPATIENT
Start: 2017-01-01 | End: 2017-01-01 | Stop reason: SDUPTHER

## 2017-01-01 RX ORDER — METHYLPREDNISOLONE SODIUM SUCCINATE 125 MG/2ML
125 INJECTION, POWDER, LYOPHILIZED, FOR SOLUTION INTRAMUSCULAR; INTRAVENOUS EVERY 12 HOURS
Status: DISCONTINUED | OUTPATIENT
Start: 2017-01-01 | End: 2017-12-31 | Stop reason: HOSPADM

## 2017-01-01 RX ORDER — FUROSEMIDE 10 MG/ML
80 INJECTION INTRAMUSCULAR; INTRAVENOUS
Status: DISCONTINUED | OUTPATIENT
Start: 2017-01-01 | End: 2017-12-31 | Stop reason: HOSPADM

## 2017-01-01 RX ORDER — ALBUTEROL SULFATE 90 UG/1
2 AEROSOL, METERED RESPIRATORY (INHALATION) EVERY 4 HOURS PRN
Status: DISCONTINUED | OUTPATIENT
Start: 2017-01-01 | End: 2017-12-31 | Stop reason: HOSPADM

## 2017-01-01 RX ORDER — MORPHINE SULFATE 4 MG/ML
1-3 INJECTION, SOLUTION INTRAMUSCULAR; INTRAVENOUS EVERY 4 HOURS PRN
Status: DISCONTINUED | OUTPATIENT
Start: 2017-01-01 | End: 2017-01-01

## 2017-01-01 RX ORDER — LORAZEPAM 2 MG/ML
1 CONCENTRATE ORAL
Status: DISCONTINUED | OUTPATIENT
Start: 2017-01-01 | End: 2017-12-31 | Stop reason: HOSPADM

## 2017-01-01 RX ORDER — FUROSEMIDE 80 MG
80 TABLET ORAL DAILY
COMMUNITY
Start: 2017-01-01 | End: 2017-01-01 | Stop reason: SDUPTHER

## 2017-01-01 RX ORDER — LEVALBUTEROL INHALATION SOLUTION 1.25 MG/3ML
1.25 SOLUTION RESPIRATORY (INHALATION)
Status: DISCONTINUED | OUTPATIENT
Start: 2017-01-01 | End: 2017-01-01

## 2017-01-01 RX ORDER — AMOXICILLIN 250 MG
2 CAPSULE ORAL 2 TIMES DAILY
Status: DISCONTINUED | OUTPATIENT
Start: 2017-01-01 | End: 2017-12-31 | Stop reason: HOSPADM

## 2017-01-01 RX ORDER — HEPARIN SODIUM 5000 [USP'U]/ML
5000 INJECTION, SOLUTION INTRAVENOUS; SUBCUTANEOUS EVERY 8 HOURS
Status: DISCONTINUED | OUTPATIENT
Start: 2017-01-01 | End: 2017-12-31 | Stop reason: HOSPADM

## 2017-01-01 RX ORDER — DILTIAZEM HYDROCHLORIDE 120 MG/1
120 CAPSULE, EXTENDED RELEASE ORAL DAILY
Qty: 30 CAP | Refills: 11 | Status: SHIPPED | OUTPATIENT
Start: 2017-01-01 | End: 2017-01-01 | Stop reason: SDUPTHER

## 2017-01-01 RX ORDER — TRAMADOL HYDROCHLORIDE 50 MG/1
50 TABLET ORAL EVERY 6 HOURS PRN
Status: DISCONTINUED | OUTPATIENT
Start: 2017-01-01 | End: 2017-12-31 | Stop reason: HOSPADM

## 2017-01-01 RX ORDER — POTASSIUM CHLORIDE 20 MEQ/1
20 TABLET, EXTENDED RELEASE ORAL DAILY
Qty: 90 TAB | Refills: 0 | Status: SHIPPED | OUTPATIENT
Start: 2017-01-01

## 2017-01-01 RX ORDER — ONDANSETRON 4 MG/1
8 TABLET, ORALLY DISINTEGRATING ORAL EVERY 8 HOURS PRN
Status: DISCONTINUED | OUTPATIENT
Start: 2017-01-01 | End: 2017-12-31 | Stop reason: HOSPADM

## 2017-01-01 RX ORDER — SODIUM POLYSTYRENE SULFONATE 15 G/60ML
15 SUSPENSION ORAL; RECTAL ONCE
Status: COMPLETED | OUTPATIENT
Start: 2017-01-01 | End: 2017-01-01

## 2017-01-01 RX ORDER — POLYETHYLENE GLYCOL 3350 17 G/17G
1 POWDER, FOR SOLUTION ORAL
Status: DISCONTINUED | OUTPATIENT
Start: 2017-01-01 | End: 2017-12-31 | Stop reason: HOSPADM

## 2017-01-01 RX ORDER — FUROSEMIDE 10 MG/ML
40 INJECTION INTRAMUSCULAR; INTRAVENOUS ONCE
Status: DISPENSED | OUTPATIENT
Start: 2017-01-01 | End: 2017-01-01

## 2017-01-01 RX ORDER — METHYLPREDNISOLONE SODIUM SUCCINATE 125 MG/2ML
125 INJECTION, POWDER, LYOPHILIZED, FOR SOLUTION INTRAMUSCULAR; INTRAVENOUS EVERY 6 HOURS
Status: DISCONTINUED | OUTPATIENT
Start: 2017-01-01 | End: 2017-01-01

## 2017-01-01 RX ORDER — LORAZEPAM 2 MG/ML
1 INJECTION INTRAMUSCULAR
Status: DISCONTINUED | OUTPATIENT
Start: 2017-01-01 | End: 2017-12-31 | Stop reason: HOSPADM

## 2017-01-01 RX ORDER — METHYLPREDNISOLONE SODIUM SUCCINATE 125 MG/2ML
125 INJECTION, POWDER, LYOPHILIZED, FOR SOLUTION INTRAMUSCULAR; INTRAVENOUS EVERY 8 HOURS
Status: DISCONTINUED | OUTPATIENT
Start: 2017-01-01 | End: 2017-01-01

## 2017-01-01 RX ORDER — POTASSIUM CHLORIDE 20 MEQ/1
TABLET, EXTENDED RELEASE ORAL
Qty: 90 TAB | Refills: 0 | Status: SHIPPED | OUTPATIENT
Start: 2017-01-01 | End: 2017-01-01 | Stop reason: SDUPTHER

## 2017-01-01 RX ORDER — FUROSEMIDE 10 MG/ML
40 INJECTION INTRAMUSCULAR; INTRAVENOUS
Status: DISCONTINUED | OUTPATIENT
Start: 2017-01-01 | End: 2017-01-01

## 2017-01-01 RX ORDER — DILTIAZEM HYDROCHLORIDE 120 MG/1
120 CAPSULE, EXTENDED RELEASE ORAL DAILY
Qty: 90 CAP | Refills: 3 | Status: SHIPPED | OUTPATIENT
Start: 2017-01-01 | End: 2017-01-01

## 2017-01-01 RX ORDER — CHLORAL HYDRATE 500 MG
1000 CAPSULE ORAL DAILY
Status: DISCONTINUED | OUTPATIENT
Start: 2017-01-01 | End: 2017-12-31 | Stop reason: HOSPADM

## 2017-01-01 RX ORDER — ATROPINE SULFATE 10 MG/ML
2 SOLUTION/ DROPS OPHTHALMIC EVERY 4 HOURS PRN
Status: DISCONTINUED | OUTPATIENT
Start: 2017-01-01 | End: 2017-12-31 | Stop reason: HOSPADM

## 2017-01-01 RX ORDER — FUROSEMIDE 80 MG
80 TABLET ORAL DAILY
Qty: 90 TAB | Refills: 3 | Status: SHIPPED | OUTPATIENT
Start: 2017-01-01

## 2017-01-01 RX ORDER — BISACODYL 10 MG
10 SUPPOSITORY, RECTAL RECTAL
Status: DISCONTINUED | OUTPATIENT
Start: 2017-01-01 | End: 2017-12-31 | Stop reason: HOSPADM

## 2017-01-01 RX ORDER — TRAMADOL HYDROCHLORIDE 50 MG/1
50 TABLET ORAL EVERY 6 HOURS PRN
Qty: 120 TAB | Refills: 3 | Status: SHIPPED
Start: 2017-01-01

## 2017-01-01 RX ORDER — LEVALBUTEROL INHALATION SOLUTION 1.25 MG/3ML
1.25 SOLUTION RESPIRATORY (INHALATION)
Status: DISCONTINUED | OUTPATIENT
Start: 2017-01-01 | End: 2017-12-31 | Stop reason: HOSPADM

## 2017-01-01 RX ORDER — BENZONATATE 100 MG/1
100 CAPSULE ORAL 3 TIMES DAILY PRN
Status: DISCONTINUED | OUTPATIENT
Start: 2017-01-01 | End: 2017-12-31 | Stop reason: HOSPADM

## 2017-01-01 RX ORDER — ONDANSETRON 4 MG/1
4 TABLET, ORALLY DISINTEGRATING ORAL EVERY 4 HOURS PRN
Status: DISCONTINUED | OUTPATIENT
Start: 2017-01-01 | End: 2017-12-31 | Stop reason: HOSPADM

## 2017-01-01 RX ORDER — DEXTROSE MONOHYDRATE 25 G/50ML
50 INJECTION, SOLUTION INTRAVENOUS ONCE
Status: COMPLETED | OUTPATIENT
Start: 2017-01-01 | End: 2017-01-01

## 2017-01-01 RX ORDER — LEVALBUTEROL INHALATION SOLUTION 1.25 MG/3ML
SOLUTION RESPIRATORY (INHALATION)
Qty: 1620 ML | Refills: 3 | Status: SHIPPED | OUTPATIENT
Start: 2017-01-01 | End: 2017-01-01

## 2017-01-01 RX ORDER — MORPHINE SULFATE 10 MG/ML
10 INJECTION, SOLUTION INTRAMUSCULAR; INTRAVENOUS
Status: DISCONTINUED | OUTPATIENT
Start: 2017-01-01 | End: 2017-12-31 | Stop reason: HOSPADM

## 2017-01-01 RX ORDER — BENZONATATE 100 MG/1
100 CAPSULE ORAL 3 TIMES DAILY PRN
Qty: 60 CAP | Refills: 0 | Status: SHIPPED | OUTPATIENT
Start: 2017-01-01 | End: 2017-01-01 | Stop reason: SDUPTHER

## 2017-01-01 RX ORDER — POTASSIUM CHLORIDE 20 MEQ/1
20 TABLET, EXTENDED RELEASE ORAL DAILY
Status: DISCONTINUED | OUTPATIENT
Start: 2017-01-01 | End: 2017-01-01

## 2017-01-01 RX ORDER — ONDANSETRON 2 MG/ML
4 INJECTION INTRAMUSCULAR; INTRAVENOUS EVERY 4 HOURS PRN
Status: DISCONTINUED | OUTPATIENT
Start: 2017-01-01 | End: 2017-12-31 | Stop reason: HOSPADM

## 2017-01-01 RX ORDER — IPRATROPIUM BROMIDE AND ALBUTEROL SULFATE 2.5; .5 MG/3ML; MG/3ML
3 SOLUTION RESPIRATORY (INHALATION) 4 TIMES DAILY
Qty: 120 VIAL | Refills: 6 | Status: SHIPPED | OUTPATIENT
Start: 2017-01-01

## 2017-01-01 RX ORDER — DULOXETIN HYDROCHLORIDE 20 MG/1
20 CAPSULE, DELAYED RELEASE ORAL DAILY
Qty: 30 CAP | Refills: 6 | Status: SHIPPED | OUTPATIENT
Start: 2017-01-01 | End: 2017-01-01 | Stop reason: SDUPTHER

## 2017-01-01 RX ORDER — ISOSORBIDE DINITRATE 10 MG/1
10 TABLET ORAL 3 TIMES DAILY
Status: DISCONTINUED | OUTPATIENT
Start: 2017-01-01 | End: 2017-01-01

## 2017-01-01 RX ORDER — METOPROLOL SUCCINATE 50 MG/1
100 TABLET, EXTENDED RELEASE ORAL DAILY
Status: DISCONTINUED | OUTPATIENT
Start: 2017-01-01 | End: 2017-12-31 | Stop reason: HOSPADM

## 2017-01-01 RX ORDER — ONDANSETRON 2 MG/ML
8 INJECTION INTRAMUSCULAR; INTRAVENOUS EVERY 8 HOURS PRN
Status: DISCONTINUED | OUTPATIENT
Start: 2017-01-01 | End: 2017-12-31 | Stop reason: HOSPADM

## 2017-01-01 RX ORDER — DULOXETIN HYDROCHLORIDE 20 MG/1
20 CAPSULE, DELAYED RELEASE ORAL DAILY
Qty: 30 CAP | Refills: 6 | Status: SHIPPED | OUTPATIENT
Start: 2017-01-01 | End: 2017-01-01

## 2017-01-01 RX ORDER — DILTIAZEM HYDROCHLORIDE 180 MG/1
180 CAPSULE, COATED, EXTENDED RELEASE ORAL DAILY
COMMUNITY

## 2017-01-01 RX ORDER — DEXTROSE MONOHYDRATE 25 G/50ML
INJECTION, SOLUTION INTRAVENOUS
Status: ACTIVE
Start: 2017-01-01 | End: 2017-01-01

## 2017-01-01 RX ORDER — TRAMADOL HYDROCHLORIDE 50 MG/1
50 TABLET ORAL EVERY 6 HOURS PRN
Qty: 120 TAB | Refills: 2 | Status: SHIPPED | OUTPATIENT
Start: 2017-01-01 | End: 2017-01-01 | Stop reason: SDUPTHER

## 2017-01-01 RX ORDER — ACETAMINOPHEN 325 MG/1
650 TABLET ORAL EVERY 6 HOURS PRN
Status: DISCONTINUED | OUTPATIENT
Start: 2017-01-01 | End: 2017-12-31 | Stop reason: HOSPADM

## 2017-01-01 RX ORDER — FUROSEMIDE 40 MG/1
40 TABLET ORAL DAILY
Status: DISCONTINUED | OUTPATIENT
Start: 2017-01-01 | End: 2017-01-01

## 2017-01-01 RX ORDER — MORPHINE SULFATE 10 MG/ML
5 INJECTION, SOLUTION INTRAMUSCULAR; INTRAVENOUS
Status: DISCONTINUED | OUTPATIENT
Start: 2017-01-01 | End: 2017-12-31 | Stop reason: HOSPADM

## 2017-01-01 RX ORDER — MORPHINE SULFATE 100 MG/5ML
10 SOLUTION ORAL
Status: DISCONTINUED | OUTPATIENT
Start: 2017-01-01 | End: 2017-12-31 | Stop reason: HOSPADM

## 2017-01-01 RX ORDER — METOPROLOL SUCCINATE 50 MG/1
50 TABLET, EXTENDED RELEASE ORAL DAILY
Qty: 90 TAB | Refills: 3 | Status: SHIPPED | OUTPATIENT
Start: 2017-01-01

## 2017-01-01 RX ORDER — POLYVINYL ALCOHOL 14 MG/ML
2 SOLUTION/ DROPS OPHTHALMIC EVERY 6 HOURS PRN
Status: DISCONTINUED | OUTPATIENT
Start: 2017-01-01 | End: 2017-12-31 | Stop reason: HOSPADM

## 2017-01-01 RX ADMIN — LORAZEPAM 1 MG: 2 INJECTION, SOLUTION INTRAMUSCULAR; INTRAVENOUS at 13:45

## 2017-01-01 RX ADMIN — FUROSEMIDE 40 MG: 10 INJECTION, SOLUTION INTRAMUSCULAR; INTRAVENOUS at 10:33

## 2017-01-01 RX ADMIN — DEXTROSE MONOHYDRATE 50 ML: 25 INJECTION, SOLUTION INTRAVENOUS at 08:24

## 2017-01-01 RX ADMIN — TRAMADOL HYDROCHLORIDE 50 MG: 50 TABLET, COATED ORAL at 11:48

## 2017-01-01 RX ADMIN — POTASSIUM CHLORIDE 20 MEQ: 1500 TABLET, EXTENDED RELEASE ORAL at 09:28

## 2017-01-01 RX ADMIN — LORAZEPAM 1 MG: 2 INJECTION, SOLUTION INTRAMUSCULAR; INTRAVENOUS at 16:47

## 2017-01-01 RX ADMIN — LEVALBUTEROL HYDROCHLORIDE 1.25 MG: 1.25 SOLUTION RESPIRATORY (INHALATION) at 16:15

## 2017-01-01 RX ADMIN — OMEGA-3 FATTY ACIDS CAP 1000 MG 1000 MG: 1000 CAP at 10:15

## 2017-01-01 RX ADMIN — ACETAMINOPHEN 650 MG: 325 TABLET, FILM COATED ORAL at 08:11

## 2017-01-01 RX ADMIN — LEVALBUTEROL HYDROCHLORIDE 1.25 MG: 1.25 SOLUTION RESPIRATORY (INHALATION) at 10:53

## 2017-01-01 RX ADMIN — TRAMADOL HYDROCHLORIDE 50 MG: 50 TABLET, COATED ORAL at 22:20

## 2017-01-01 RX ADMIN — SODIUM POLYSTYRENE SULFONATE 15 G: 15 SUSPENSION ORAL; RECTAL at 10:15

## 2017-01-01 RX ADMIN — TRAMADOL HYDROCHLORIDE 50 MG: 50 TABLET, COATED ORAL at 12:57

## 2017-01-01 RX ADMIN — LEVALBUTEROL HYDROCHLORIDE 1.25 MG: 1.25 SOLUTION RESPIRATORY (INHALATION) at 11:05

## 2017-01-01 RX ADMIN — ONDANSETRON 4 MG: 2 INJECTION INTRAMUSCULAR; INTRAVENOUS at 05:54

## 2017-01-01 RX ADMIN — LEVALBUTEROL HYDROCHLORIDE 1.25 MG: 1.25 SOLUTION RESPIRATORY (INHALATION) at 08:43

## 2017-01-01 RX ADMIN — HEPARIN SODIUM 5000 UNITS: 5000 INJECTION, SOLUTION INTRAVENOUS; SUBCUTANEOUS at 23:09

## 2017-01-01 RX ADMIN — LEVALBUTEROL HYDROCHLORIDE 1.25 MG: 1.25 SOLUTION RESPIRATORY (INHALATION) at 02:44

## 2017-01-01 RX ADMIN — METHYLPREDNISOLONE SODIUM SUCCINATE 125 MG: 125 INJECTION, POWDER, FOR SOLUTION INTRAMUSCULAR; INTRAVENOUS at 10:33

## 2017-01-01 RX ADMIN — FUROSEMIDE 40 MG: 40 TABLET ORAL at 08:11

## 2017-01-01 RX ADMIN — LEVALBUTEROL HYDROCHLORIDE 1.25 MG: 1.25 SOLUTION RESPIRATORY (INHALATION) at 18:05

## 2017-01-01 RX ADMIN — METOPROLOL SUCCINATE 100 MG: 50 TABLET, EXTENDED RELEASE ORAL at 08:11

## 2017-01-01 RX ADMIN — METHYLPREDNISOLONE SODIUM SUCCINATE 125 MG: 125 INJECTION, POWDER, FOR SOLUTION INTRAMUSCULAR; INTRAVENOUS at 14:01

## 2017-01-01 RX ADMIN — ONDANSETRON 8 MG: 2 INJECTION INTRAMUSCULAR; INTRAVENOUS at 11:31

## 2017-01-01 RX ADMIN — HEPARIN SODIUM 5000 UNITS: 5000 INJECTION, SOLUTION INTRAVENOUS; SUBCUTANEOUS at 14:20

## 2017-01-01 RX ADMIN — HEPARIN SODIUM 5000 UNITS: 5000 INJECTION, SOLUTION INTRAVENOUS; SUBCUTANEOUS at 14:00

## 2017-01-01 RX ADMIN — LEVALBUTEROL HYDROCHLORIDE 1.25 MG: 1.25 SOLUTION RESPIRATORY (INHALATION) at 07:17

## 2017-01-01 RX ADMIN — LEVALBUTEROL HYDROCHLORIDE 1.25 MG: 1.25 SOLUTION RESPIRATORY (INHALATION) at 14:51

## 2017-01-01 RX ADMIN — CALCIUM GLUCONATE 1 G: 94 INJECTION, SOLUTION INTRAVENOUS at 06:45

## 2017-01-01 RX ADMIN — HEPARIN SODIUM 5000 UNITS: 5000 INJECTION, SOLUTION INTRAVENOUS; SUBCUTANEOUS at 05:30

## 2017-01-01 RX ADMIN — ACETAMINOPHEN 650 MG: 325 TABLET, FILM COATED ORAL at 23:08

## 2017-01-01 RX ADMIN — HEPARIN SODIUM 5000 UNITS: 5000 INJECTION, SOLUTION INTRAVENOUS; SUBCUTANEOUS at 05:59

## 2017-01-01 RX ADMIN — LEVALBUTEROL HYDROCHLORIDE 1.25 MG: 1.25 SOLUTION RESPIRATORY (INHALATION) at 18:44

## 2017-01-01 RX ADMIN — LEVALBUTEROL HYDROCHLORIDE 1.25 MG: 1.25 SOLUTION RESPIRATORY (INHALATION) at 19:19

## 2017-01-01 RX ADMIN — METHYLPREDNISOLONE SODIUM SUCCINATE 125 MG: 125 INJECTION, POWDER, FOR SOLUTION INTRAMUSCULAR; INTRAVENOUS at 00:34

## 2017-01-01 RX ADMIN — HEPARIN SODIUM 5000 UNITS: 5000 INJECTION, SOLUTION INTRAVENOUS; SUBCUTANEOUS at 05:27

## 2017-01-01 RX ADMIN — CHOLECALCIFEROL TAB 25 MCG (1000 UNIT) 2000 UNITS: 25 TAB at 07:50

## 2017-01-01 RX ADMIN — LEVALBUTEROL HYDROCHLORIDE 1.25 MG: 1.25 SOLUTION RESPIRATORY (INHALATION) at 10:46

## 2017-01-01 RX ADMIN — METOPROLOL SUCCINATE 100 MG: 50 TABLET, EXTENDED RELEASE ORAL at 07:50

## 2017-01-01 RX ADMIN — CHOLECALCIFEROL TAB 25 MCG (1000 UNIT) 2000 UNITS: 25 TAB at 09:28

## 2017-01-01 RX ADMIN — MORPHINE SULFATE 1 MG: 4 INJECTION INTRAVENOUS at 15:25

## 2017-01-01 RX ADMIN — ISOSORBIDE DINITRATE 10 MG: 10 TABLET ORAL at 07:49

## 2017-01-01 RX ADMIN — POTASSIUM CHLORIDE 20 MEQ: 1500 TABLET, EXTENDED RELEASE ORAL at 08:11

## 2017-01-01 RX ADMIN — LEVALBUTEROL HYDROCHLORIDE 1.25 MG: 1.25 SOLUTION RESPIRATORY (INHALATION) at 06:26

## 2017-01-01 RX ADMIN — PHENYLEPHRINE HYDROCHLORIDE 2 SPRAY: 1 SPRAY NASAL at 02:50

## 2017-01-01 RX ADMIN — ALBUTEROL SULFATE 2 PUFF: 90 AEROSOL, METERED RESPIRATORY (INHALATION) at 17:25

## 2017-01-01 RX ADMIN — METOPROLOL SUCCINATE 100 MG: 50 TABLET, EXTENDED RELEASE ORAL at 10:15

## 2017-01-01 RX ADMIN — LEVALBUTEROL HYDROCHLORIDE 1.25 MG: 1.25 SOLUTION RESPIRATORY (INHALATION) at 05:39

## 2017-01-01 RX ADMIN — FUROSEMIDE 40 MG: 40 TABLET ORAL at 09:28

## 2017-01-01 RX ADMIN — FUROSEMIDE 40 MG: 40 TABLET ORAL at 20:15

## 2017-01-01 RX ADMIN — LEVALBUTEROL HYDROCHLORIDE 1.25 MG: 1.25 SOLUTION RESPIRATORY (INHALATION) at 15:06

## 2017-01-01 RX ADMIN — INSULIN HUMAN 10 UNITS: 100 INJECTION, SOLUTION PARENTERAL at 08:25

## 2017-01-01 RX ADMIN — HEPARIN SODIUM 5000 UNITS: 5000 INJECTION, SOLUTION INTRAVENOUS; SUBCUTANEOUS at 22:25

## 2017-01-01 RX ADMIN — ACETAMINOPHEN 650 MG: 325 TABLET, FILM COATED ORAL at 01:27

## 2017-01-01 RX ADMIN — ISOSORBIDE DINITRATE 10 MG: 10 TABLET ORAL at 14:12

## 2017-01-01 RX ADMIN — METHYLPREDNISOLONE SODIUM SUCCINATE 125 MG: 125 INJECTION, POWDER, FOR SOLUTION INTRAMUSCULAR; INTRAVENOUS at 22:06

## 2017-01-01 RX ADMIN — LORAZEPAM 1 MG: 2 INJECTION, SOLUTION INTRAMUSCULAR; INTRAVENOUS at 11:31

## 2017-01-01 RX ADMIN — CHOLECALCIFEROL TAB 25 MCG (1000 UNIT) 2000 UNITS: 25 TAB at 10:15

## 2017-01-01 RX ADMIN — LEVALBUTEROL HYDROCHLORIDE 1.25 MG: 1.25 SOLUTION RESPIRATORY (INHALATION) at 19:14

## 2017-01-01 RX ADMIN — MORPHINE SULFATE 5 MG: 10 INJECTION INTRAVENOUS at 11:30

## 2017-01-01 RX ADMIN — PHENYLEPHRINE HYDROCHLORIDE 2 SPRAY: 1 SPRAY NASAL at 23:14

## 2017-01-01 RX ADMIN — METOPROLOL SUCCINATE 100 MG: 50 TABLET, EXTENDED RELEASE ORAL at 09:27

## 2017-01-01 RX ADMIN — LEVALBUTEROL HYDROCHLORIDE 1.25 MG: 1.25 SOLUTION RESPIRATORY (INHALATION) at 23:29

## 2017-01-01 RX ADMIN — LEVALBUTEROL HYDROCHLORIDE 1.25 MG: 1.25 SOLUTION RESPIRATORY (INHALATION) at 15:19

## 2017-01-01 RX ADMIN — HYDRALAZINE HYDROCHLORIDE 10 MG: 10 TABLET, FILM COATED ORAL at 14:12

## 2017-01-01 RX ADMIN — MORPHINE SULFATE 5 MG: 10 INJECTION INTRAVENOUS at 13:45

## 2017-01-01 RX ADMIN — METHYLPREDNISOLONE SODIUM SUCCINATE 125 MG: 125 INJECTION, POWDER, FOR SOLUTION INTRAMUSCULAR; INTRAVENOUS at 21:46

## 2017-01-01 RX ADMIN — POTASSIUM CHLORIDE 20 MEQ: 1500 TABLET, EXTENDED RELEASE ORAL at 09:00

## 2017-01-01 RX ADMIN — HEPARIN SODIUM 5000 UNITS: 5000 INJECTION, SOLUTION INTRAVENOUS; SUBCUTANEOUS at 05:33

## 2017-01-01 RX ADMIN — MORPHINE SULFATE 1 MG: 4 INJECTION INTRAVENOUS at 09:13

## 2017-01-01 RX ADMIN — HEPARIN SODIUM 5000 UNITS: 5000 INJECTION, SOLUTION INTRAVENOUS; SUBCUTANEOUS at 22:00

## 2017-01-01 RX ADMIN — HEPARIN SODIUM 5000 UNITS: 5000 INJECTION, SOLUTION INTRAVENOUS; SUBCUTANEOUS at 14:12

## 2017-01-01 RX ADMIN — CHOLECALCIFEROL TAB 25 MCG (1000 UNIT) 2000 UNITS: 25 TAB at 08:11

## 2017-01-01 RX ADMIN — FUROSEMIDE 80 MG: 10 INJECTION, SOLUTION INTRAMUSCULAR; INTRAVENOUS at 08:53

## 2017-01-01 RX ADMIN — METHYLPREDNISOLONE SODIUM SUCCINATE 125 MG: 125 INJECTION, POWDER, FOR SOLUTION INTRAMUSCULAR; INTRAVENOUS at 05:27

## 2017-01-01 RX ADMIN — FUROSEMIDE 40 MG: 40 TABLET ORAL at 07:50

## 2017-01-01 RX ADMIN — ACETAMINOPHEN 650 MG: 325 TABLET, FILM COATED ORAL at 14:39

## 2017-01-01 RX ADMIN — STANDARDIZED SENNA CONCENTRATE AND DOCUSATE SODIUM 2 TABLET: 8.6; 5 TABLET, FILM COATED ORAL at 07:49

## 2017-01-01 RX ADMIN — ALBUTEROL SULFATE 2 PUFF: 90 AEROSOL, METERED RESPIRATORY (INHALATION) at 05:30

## 2017-01-01 RX ADMIN — HEPARIN SODIUM 5000 UNITS: 5000 INJECTION, SOLUTION INTRAVENOUS; SUBCUTANEOUS at 13:12

## 2017-01-01 RX ADMIN — METHYLPREDNISOLONE SODIUM SUCCINATE 125 MG: 125 INJECTION, POWDER, FOR SOLUTION INTRAMUSCULAR; INTRAVENOUS at 18:30

## 2017-01-01 RX ADMIN — TRAMADOL HYDROCHLORIDE 50 MG: 50 TABLET, COATED ORAL at 04:43

## 2017-01-01 RX ADMIN — TRAMADOL HYDROCHLORIDE 50 MG: 50 TABLET, COATED ORAL at 10:33

## 2017-01-01 RX ADMIN — LEVALBUTEROL HYDROCHLORIDE 1.25 MG: 1.25 SOLUTION RESPIRATORY (INHALATION) at 11:18

## 2017-01-01 ASSESSMENT — COGNITIVE AND FUNCTIONAL STATUS - GENERAL
DAILY ACTIVITIY SCORE: 14
PERSONAL GROOMING: A LITTLE
SUGGESTED CMS G CODE MODIFIER MOBILITY: CN
WALKING IN HOSPITAL ROOM: TOTAL
TURNING FROM BACK TO SIDE WHILE IN FLAT BAD: UNABLE
MOBILITY SCORE: 6
DRESSING REGULAR UPPER BODY CLOTHING: A LOT
TOILETING: TOTAL
HELP NEEDED FOR BATHING: A LOT
MOVING FROM LYING ON BACK TO SITTING ON SIDE OF FLAT BED: UNABLE
SUGGESTED CMS G CODE MODIFIER DAILY ACTIVITY: CK
STANDING UP FROM CHAIR USING ARMS: TOTAL
CLIMB 3 TO 5 STEPS WITH RAILING: TOTAL
MOVING TO AND FROM BED TO CHAIR: UNABLE
DRESSING REGULAR LOWER BODY CLOTHING: A LOT

## 2017-01-01 ASSESSMENT — COPD QUESTIONNAIRES
DURING THE PAST 4 WEEKS HOW MUCH DID YOU FEEL SHORT OF BREATH: SOME OF THE TIME
HAVE YOU SMOKED AT LEAST 100 CIGARETTES IN YOUR ENTIRE LIFE: YES
HAVE YOU SMOKED AT LEAST 100 CIGARETTES IN YOUR ENTIRE LIFE: YES
DO YOU EVER COUGH UP ANY MUCUS OR PHLEGM?: YES, A FEW DAYS A WEEK OR MONTH
DO YOU EVER COUGH UP ANY MUCUS OR PHLEGM?: YES, A FEW DAYS A WEEK OR MONTH
COPD SCREENING SCORE: 7
COPD SCREENING SCORE: 7
DURING THE PAST 4 WEEKS HOW MUCH DID YOU FEEL SHORT OF BREATH: SOME OF THE TIME
COPD: 1

## 2017-01-01 ASSESSMENT — ENCOUNTER SYMPTOMS
HEADACHES: 0
FLANK PAIN: 0
CHILLS: 0
VOMITING: 0
WEAKNESS: 1
WEAKNESS: 1
WHEEZING: 0
COUGH: 1
DIAPHORESIS: 0
ABDOMINAL PAIN: 0
ABDOMINAL PAIN: 0
DIZZINESS: 0
COUGH: 1
DEPRESSION: 0
WEAKNESS: 1
FOCAL WEAKNESS: 0
BACK PAIN: 0
FLANK PAIN: 0
SPUTUM PRODUCTION: 0
MYALGIAS: 0
FOCAL WEAKNESS: 0
DIZZINESS: 0
HEARTBURN: 0
HEARTBURN: 0
NERVOUS/ANXIOUS: 0
CONSTIPATION: 0
FLANK PAIN: 0
CONSTIPATION: 0
DIARRHEA: 0
FEVER: 0
FOCAL WEAKNESS: 0
DEPRESSION: 0
ABDOMINAL PAIN: 0
FEVER: 0
DIARRHEA: 0
CHANGE IN LEVEL OF CONSCIOUSNESS: 1
HEADACHES: 0
NERVOUS/ANXIOUS: 0
PALPITATIONS: 0
NAUSEA: 0
MYALGIAS: 0
VOMITING: 0
MEMORY LOSS: 0
SHORTNESS OF BREATH: 1
CHILLS: 0
CHILLS: 0
SHORTNESS OF BREATH: 1
NERVOUS/ANXIOUS: 0
BACK PAIN: 0
PALPITATIONS: 0
BACK PAIN: 0
DEPRESSION: 0
FEVER: 0
HEADACHES: 0
DIAPHORESIS: 0
HEADACHES: 0
COUGH: 1
PALPITATIONS: 0
SHORTNESS OF BREATH: 1
BLURRED VISION: 0
MEMORY LOSS: 1
VOMITING: 0
SPEECH CHANGE: 0
NERVOUS/ANXIOUS: 0
NAUSEA: 0
SENSORY CHANGE: 0
ABDOMINAL PAIN: 0
SHORTNESS OF BREATH: 1
DIAPHORESIS: 0
MYALGIAS: 0
MEMORY LOSS: 0
FLANK PAIN: 0
SPUTUM PRODUCTION: 0
SPUTUM PRODUCTION: 0
WEAKNESS: 1
FOCAL WEAKNESS: 0
PALPITATIONS: 0
NAUSEA: 0
SPUTUM PRODUCTION: 0
MYALGIAS: 1
VOMITING: 0
COUGH: 1
BACK PAIN: 0

## 2017-01-01 ASSESSMENT — PAIN SCALES - GENERAL
PAINLEVEL_OUTOF10: 5
PAINLEVEL_OUTOF10: 10
PAINLEVEL_OUTOF10: 5
PAINLEVEL_OUTOF10: 7
PAINLEVEL_OUTOF10: 10
PAINLEVEL_OUTOF10: 7
PAINLEVEL_OUTOF10: 10
PAINLEVEL_OUTOF10: 9
PAINLEVEL_OUTOF10: 0
PAINLEVEL_OUTOF10: 10
PAINLEVEL_OUTOF10: 10
PAINLEVEL_OUTOF10: 5
PAINLEVEL_OUTOF10: 9
PAINLEVEL_OUTOF10: 0
PAINLEVEL_OUTOF10: 5
PAINLEVEL_OUTOF10: 5
PAINLEVEL_OUTOF10: 9
PAINLEVEL_OUTOF10: 9
PAINLEVEL_OUTOF10: 1
PAINLEVEL_OUTOF10: 0
PAINLEVEL_OUTOF10: 0
PAINLEVEL_OUTOF10: 5
PAINLEVEL_OUTOF10: 0

## 2017-01-01 ASSESSMENT — ACTIVITIES OF DAILY LIVING (ADL)
PHYSICAL_TRANSFER_REQUIRES_ASSISTANCE: 1
CONTINENCE_REQUIRES_ASSISTANCE: 1
BATHING_REQUIRES_ASSISTANCE: 1

## 2017-01-01 ASSESSMENT — PATIENT HEALTH QUESTIONNAIRE - PHQ9
CLINICAL INTERPRETATION OF PHQ2 SCORE: 0
9. THOUGHTS THAT YOU WOULD BE BETTER OFF DEAD, OR OF HURTING YOURSELF: NOT AT ALL
SUM OF ALL RESPONSES TO PHQ QUESTIONS 1-9: 6
1. LITTLE INTEREST OR PLEASURE IN DOING THINGS: SEVERAL DAYS
8. MOVING OR SPEAKING SO SLOWLY THAT OTHER PEOPLE COULD HAVE NOTICED. OR THE OPPOSITE, BEING SO FIGETY OR RESTLESS THAT YOU HAVE BEEN MOVING AROUND A LOT MORE THAN USUAL: NOT AT ALL
SUM OF ALL RESPONSES TO PHQ9 QUESTIONS 1 AND 2: 2
6. FEELING BAD ABOUT YOURSELF - OR THAT YOU ARE A FAILURE OR HAVE LET YOURSELF OR YOUR FAMILY DOWN: SEVERAL DAYS
3. TROUBLE FALLING OR STAYING ASLEEP OR SLEEPING TOO MUCH: NOT AT ALL
5. POOR APPETITE OR OVEREATING: MORE THAN HALF THE DAYS
2. FEELING DOWN, DEPRESSED, IRRITABLE, OR HOPELESS: SEVERAL DAYS
4. FEELING TIRED OR HAVING LITTLE ENERGY: SEVERAL DAYS
7. TROUBLE CONCENTRATING ON THINGS, SUCH AS READING THE NEWSPAPER OR WATCHING TELEVISION: NOT AT ALL

## 2017-01-01 ASSESSMENT — LIFESTYLE VARIABLES
ALCOHOL_USE: NO
EVER_SMOKED: YES
EVER_SMOKED: YES

## 2017-01-04 NOTE — PROGRESS NOTES
Outbound  call for Remote Home Tele-Monitoring CHF    Alerted: Empty Packet X 6 days    Attempted to Contact Michela today but no answer.  Checked hospitals.        She was admitted to [facility] on 12/29/16.  She is currently on the telemetry floor.                    Per the Nurse she is being treated for A-fib and breathing issues. Reported to be                     Improving and may go home in the next few days.

## 2017-01-09 NOTE — PROGRESS NOTES
Outbound  call for Remote Home Tele-Monitoring CHF    Alerted: Empty Packet    Attempted contact with patient via cell phone but she is not picking this up today.    Patient remains in [facility] --Spoke with Skye  at [facility].  Patient remains unstable related to her breathing.  Michela is having difficulties maintaining her 02 saturation levels.  CM is talking with patient today to   try and discharge to skilled nursing.  CM indicated if patient doesn't agree with this plan. She may have a discussion     regarding Hospice/Palliative care.     Interventions/Education:  --Continue to follow patient at [place] to ascertain final plan.   --Arrange with local family member Saira to  [employer] equipment

## 2017-01-10 NOTE — TELEPHONE ENCOUNTER
----- Message from Sridevi Shah L.P.N. sent at 1/5/2017  1:57 PM PST -----  Regarding: FW: patient's niece has question about medication given in hospital  Dr. Mukherjee has a message to call Dr. Boyce @ [facility]     ----- Message -----     From: Skye Patrick     Sent: 1/5/2017  12:49 PM       To: Sridevi Shah L.P.N.  Subject: patient's niece has question about medicatio#    RO/Sridevi      Patient's niece Saira called and said patient is in the hospital at [facility]. The patient was given Amiodarone and she wants to know what it is and if it will effect the medication Dr. Mukherjee has her on. She can be reached at [phone]

## 2017-01-10 NOTE — PROGRESS NOTES
"Returned Michela's call--msg left on \"CC\" phone    She was discharged from the hospital last night.  She states she will have Home Health and PT working with her.    Hospital did send her home with a mask and a pulse oximeter.  She is suppose to be taking her 02 every 30 minutes.     Per Michela she is to sit down and just rest when her 02 drops.  She will use the mask at those times.      Per Michela [facility] home health is work to try and get her a motorized wheelchair.  Michela doesn't have the strength/breath to wheel herself.     Plan:    --Patient has a follow up appt with Dr. Zimmerman on 1/30/17  --Reviewed risks of hypoxia with Michela --she continues to live by herself but states her son, niece and cousins will be checking on her daily now.   --Michela wishes to resume daily biometrics--She states this is what she feels has kept her alive.   --SW contact to see if a discharge summary could be obtained from [facility].   "

## 2017-01-10 NOTE — TELEPHONE ENCOUNTER
----- Message from Sridevi Shah L.P.N. sent at 1/5/2017  1:33 PM PST -----  Regarding: FW: Dr. Boyce would like a call back from Dr. Mukherjee  Printed and to Dr. Mukherjee  Last seen 7/12/16.  No follow up  Pulmonary HTN  ----- Message -----     From: Skye Patrick     Sent: 1/5/2017  11:48 AM       To: Sridevi Shah L.P.N.  Subject: Dr. Boyce would like a call back from Dr. Charly VALENCIA/Sridevi Boyce at [facility] would like a call back from Dr. Mukherjee. She can be reached at [phone].

## 2017-01-11 NOTE — PROGRESS NOTES
"Outbound  call for Remote Home Tele-Monitoring CHF    Alerted: Empty Packet      Contact today with  Michela today.  She states she is unable to walk today due to severe bilateral knee pain.  She states they are not swollen just painful-- 9/10 on pain scale.  She has taken some tylenol in hopes that will improve it. She states she has never had this knee pain before  She did manage to get from her bedroom to her chair in time for the home health nursing visit today.      Patient states Home Health is trying to get a walker for her.  She does not have any other assistive devices currently in her home. She did not take her lasix today so she would have to get up and pee.           02 use per patient: Patient states her 02 was 93% when the HHN assessed her around noon         SOB: Denies as long as she doesn't move much and has her 02 on 6 to 7 L nc 24/7         Cough: Denies         Chest pain: Denies         Swelling: Patient doesn't think she has any swelling but difficult to assess her own feet         Weakness/Fatigue: Patient states \"weak as a kitten\"  Patient states son did come and check on her today.           Michela  reports that she is compliant with medications and diet. Son got her some food as she was not able to get up today to get any on her own.     Interventions/Education:  --\"CC\" left message for [facility] Nurse to review home situation/safety/skin integrity and general status of patient/emergency help assistance availability. Patient states she has medical alert button.   --Michela will attempt to do biometrics tomorrow.  --Reviewed safety concerns with patient, lack of mobility, no assistive devices in home/living alone/risk of fall--she verbalizes understanding of her risk/ she chooses to continue in her current situation.    --[facility] line [phone]  and 911 again provided to patient. Education as to when to contact emergency services.             "

## 2017-01-12 NOTE — PROGRESS NOTES
Outbound  call for Remote Home Tele-Monitoring CHF    Alerted: Weight decreased to 341.5 lbs.  (per hospital on 12/27/15 her wt was 353 lbs. )                BP not transmitting Michela reports that it did read but she could not get it to transmit--98/71  Contact today with  Michela          02 use per patient: 02 at via mask at 7 L continuous and Bipap at night at 8 L         SOB: Denies feeling SOB but mainly sits in her chair.           Cough: Denies         Chest pain: Denies         Headaches: Denies         Swelling: in feet 1 to 2 plus edema per patient based on what the home health nurse told her yesterday                          Patient reports it is unchanged         Dizziness: Denies         Nausea: Denies         Weakness/Fatigue: Weakness present but patient states she is able today to get out of her chair, using crutches until the walker is delivered.  She can get to her bathroom/bedroom/kitchen.                                          Patient states she is able to get food. Her cousins are bringing food and her friend contacted                                           [facility] but Michela doesn't think she needs this.           Michela reports that she is compliant with medications and diet.     11/11/17 Msg left on [facility] records--requested discharge summary to be fax to [phone].       Interventions/Education:  --[facility] following Patient  --Physical therapy to start in next day or two  --2nd message left for [facility] Nurse to review patient plan  --[employer] ticket placed as new BP cuff not transmitting       Patient verbalized understanding of education/teaching provided

## 2017-01-13 NOTE — TELEPHONE ENCOUNTER
1. Caller Name: Patient                      Call Back Number: [phone] (home)     2. Message: She would like a prescription for Breo. Patient has an upcoming appointment with BUDDY Hernandez on 2/21/16. She was just released from [facility] for her heart,Lungs and Oxygen levels. Please advise    3. Patient approves office to leave a detailed voicemail/MyChart message: yes

## 2017-01-13 NOTE — TELEPHONE ENCOUNTER
Darnell why the patient is requesting Breo? She was on dulera at her last visit. His formulary change?

## 2017-01-17 NOTE — PROGRESS NOTES
"Outbound  call for Remote Home Tele-Monitoring CHF    Alerted: missing data for BP and weight    Contact today with  Michela          02 use per patient: Per Michela she is now using the mask when she get up to ambulate but just sitting in the chair,                                           which is the majority of her activity, she is using the nasal canula.  She is stilling using 02 at                                            7 L 24/7         SOB: Michela Denies SOB at rest but does become SOB with ambulation with 02 in place         Cough: Denies         Chest pain: Denies         Headaches: Denies         Swelling: Mild puffiness in her feet but remain unchanged/stable         Dizziness: Denies         Nausea: Denies         Weakness/Fatigue: Patient reports that she can stand for short periods of time to make something to eat but tires                                           Very quickly                  Michela reports that she is compliant with medications and diet.          Michela still has not obtained a walker, and states that PT has not yet been out to see her.           [facility] still seeing Michela as well.    Interventions/Education:  --\"CC\" contacted [facility]/spoke with Annie/PT is set up of initial eval today. Requested PT recommend             A walker for patient and send request to Dr. Zimmerman  --Requested [facility] to fax Discharge summary/Discharge medications to Dr. Zimmerman's office  --Requested patient to call Katty to troubleshoot BP cuff not transmitting.  Per Katty's note attempted call to patient  --Reviewed maximizing your energy tips with Michela      Patient verbalized understanding of education/teaching provided          "

## 2017-01-19 NOTE — TELEPHONE ENCOUNTER
Spoke with Abbie who reports that patient has decided to follow up with Dr. Sanchez and has an appointment tomorrow with [facility].  She also has spoken with Nicol the outreach nurse who also advised she get all on the same page and make a decision who she plans to continue care with.

## 2017-01-19 NOTE — PROGRESS NOTES
"Outbound  call for Remote Home Tele-Monitoring CHF    Alerted: HR 61 and wt 342.5 increase of 3.5 lbs. In 24 hours.      Biometrics since return discharged from hospital        Contact today with  Michela          02 use per patient: 02 at  7 L via mask or at rest via nasal canula continuous 24/7         SOB: Patient reports no SOB while sitting in chair at rest/breathing is very labored when she walks even a few steps.         Cough: Intermittent         Chest pain: Denies         Swelling: Per Home Health Nurse Abbie her leg/feet edema has increased and is now back to 2+ edema         Dizziness: Denies         Nausea: Denies         Weakness/Fatigue: Per patient and HHN-Abbie states patient is able to ambulate to bedroom/bathroom/kitchen with major exhaustion and labored breathing with 02 in place as per above          Michela states she is tired of being asked the same questions by \"CC\" and by the Home Health Nurse.  She would        Like to discontinue RHM and just have home health continue to see her.  Abbie confirmed that Home Health         Will continue to visit patient.  Physical therapy has not been out.  Abbie is working with the MD's to secure a          Motorized wheel chair for the patient as the patient due to weakness and labored breathing is unable to propel          Herself.           Abbie states she has placed a call to Dr. Mukherjee regarding her lasix dosing but she is scheduled to see          Dr. Sanchez at [place] in the next week.    Interventions/Education:  --Michela  is able to identify yellow and red symptoms and the necessary interventions and provided Teach Back on causes; symptoms; home care interventions; when to seek medical care versus immediate medical care for CHF management. Patient has not exhibited good judgement regarding when to seek medical care with the two recent hospitalizations.  Education provided to Michela of importance of early interventions with CHF " exacerbation.   --[facility] will continue care with patient  --PCP notified of home remote -tele monitoring discontinuation-last progress note attached  --Katty notified of equipment .  Patient states she does have the original box and is able to pack the equipment up for return. Patient aware Katty will contact her to let her know the UPS  date.      Patient verbalized understanding of education/teaching provided

## 2017-01-19 NOTE — TELEPHONE ENCOUNTER
Message left for Abbie to call.  Pt discharged from [facility].  Outreach nurse contacted her yesterday and reports she was doing ok.  She was to [facility] Dr. Sanchez per discharge summary.  She does not have a follow up scheduled.  Her last time seen in office was July with Dr. Mukherjee.

## 2017-01-19 NOTE — TELEPHONE ENCOUNTER
----- Message from Balbina Chavez, Med Ass't sent at 1/19/2017 12:02 PM PST -----  Regarding: [facility], Edema and breathing problems   Contact: [phone]  ERIK/Rios Laureanoelle is calling from [facility] and pt was discharged from the hospital due to Shortness of Breath and retaining water.   She did increase her lasix from 20 mg to 40 mg on Weds 1/18. She is having 2+ pitting edema bilaterally and she is having some trouble with her breathing. On Tues 1/17 her edema was 1+. Pt hasn't had any more urine out put with the increase in lasix.   She is wondering if anything else needs to be done? Does she need to be seen?   Please call Abbie at [phone]

## 2017-02-23 NOTE — TELEPHONE ENCOUNTER
Patient had office visit on 2/21/17 with BUDDY Hernandez and canceled. She has an appointment on 03/16/17 with BUDDY Hernandez. Haven't heard back from the patient.

## 2017-02-28 NOTE — MR AVS SNAPSHOT
"        Michela Mustafa Yandel   2017 3:00 PM   Office Visit   MRN: [id number]    Department:  [facility]   Dept Phone:  [phone]    Description:  Female : 1945   Provider:  Abbie Zimmerman M.D.           Reason for Visit     Orders Needed wheelchair/ [facility]     Follow-Up           Allergies as of 2017     Allergen Noted Reactions    Angiotensin Receptor Blockers 2012       Codeine 2011       Crestor [Rosuvastatin Calcium] 2012   Myalgia    Lipitor [Atorvastatin Calcium] 2012   Myalgia    Niacin 2012   Itching    Pradaxa 2014       Severe bleeding tendency    Red Yeast Rice [Misc Terri Hmg Coa Reduct Inhib] 2014   Myalgia    Statins [Hmg-Coa-R Inhibitors] 2011       Hydrocodone 2016   Unspecified    Headache. No reaction to Tylenol.      You were diagnosed with     Combined systolic and diastolic congestive heart failure, NYHA class 3 (CMS-HCC)   [807511]       Morbid obesity with BMI of 50.0-59.9, adult (CMS-HCC)   [888654]       Need for pneumococcal vaccination   [135707]       Encounter for screening mammogram for breast cancer   [9090365]       Colon cancer screening   [569739]       Chronic respiratory failure with hypoxia (CMS-HCC)   [944319]       COPD with asthma (CMS-HCC)   [559624]       Impaired mobility and activities of daily living   [064327]         Vital Signs     Blood Pressure Pulse Temperature Respirations Height Oxygen Saturation    140/90 mmHg 70 36.2 °C (97.2 °F) 16 1.702 m (5' 7.01\") 96%    Smoking Status                   Former Smoker           Basic Information     Date Of Birth Sex Race Ethnicity Preferred Language    1945 Female Black or  Non- English      Your appointments     Mar 03, 2017  3:00 PM   Established Patient Pul with CARLOS Cline   [facility] Medicine (--)    [address]  [address]  [place] [id number] [phone]            Mar " 16, 2017 10:40 AM   Established Patient Pul with MEET Hernandez   [facility] Pulmonary Medicine (--)    [address]  [address]  [place] [id number] [phone]              Problem List              ICD-10-CM Priority Class Noted - Resolved    Morbid obesity with BMI of 50.0-59.9, adult (CMS-HCC) E66.01, Z68.43 Low  Unknown - Present    Obstructive sleep apnea G47.33 Low  Unknown - Present    Chronic atrial fibrillation (HCC) (Chronic) I48.2 High  3/21/2012 - Present    Hyperlipidemia (Chronic) E78.5 Low  4/19/2012 - Present    Edema extremities R60.0 Low  4/19/2012 - Present    Chronic kidney disease (CKD), stage III (moderate) (Chronic) N18.3 Medium  6/27/2012 - Present    Vitamin D deficiency disease E55.9   6/27/2012 - Present    Hypertension (Chronic) I10 Low  1/6/2014 - Present    COPD with asthma (HCC) (Chronic) J44.9, J45.909 Medium  Unknown - Present    Pulmonary HTN (CMS-HCC) (Chronic) I27.2 Medium  7/4/2016 - Present    Combined systolic and diastolic congestive heart failure, NYHA class 3 (CMS-HCC) I50.40 High  11/26/2016 - Present      Health Maintenance        Date Due Completion Dates    COLON CANCER SCREENING ANNUAL FIT 2/24/2017 2/24/2016, 7/17/2014    IMM DTaP/Tdap/Td Vaccine (1 - Tdap) 4/1/2018 (Originally 8/23/1964) ---    IMM ZOSTER VACCINE 4/1/2018 (Originally 8/23/2005) ---    BONE DENSITY 1/6/2022 (Originally 10/19/2014) 10/19/2009    COLONOSCOPY 4/11/2019 4/11/2016, 3/30/2016            Current Immunizations     13-VALENT PCV PREVNAR 10/1/2015    Influenza TIV (IM) 10/1/2015, 10/1/2013, 9/1/2012    Influenza Vaccine 10/18/2011    Influenza Vaccine Adult HD 10/11/2016    Pneumococcal Vaccine (UF)Historical Data 1/18/2011    Pneumococcal polysaccharide vaccine (PPSV-23) 2/28/2017      Below and/or attached are the medications your provider expects you to take. Review all of your home medications and newly ordered medications with your provider and/or pharmacist. Follow  medication instructions as directed by your provider and/or pharmacist. Please keep your medication list with you and share with your provider. Update the information when medications are discontinued, doses are changed, or new medications (including over-the-counter products) are added; and carry medication information at all times in the event of emergency situations     Allergies:  ANGIOTENSIN RECEPTOR BLOCKERS - (reactions not documented)     CODEINE - (reactions not documented)     CRESTOR - Myalgia     LIPITOR - Myalgia     NIACIN - Itching     PRADAXA - (reactions not documented)     RED YEAST RICE - Myalgia     STATINS - (reactions not documented)     HYDROCODONE - Unspecified               Medications  Valid as of: February 28, 2017 -  5:10 PM    Generic Name Brand Name Tablet Size Instructions for use    Albuterol Sulfate (Aero Soln) PROAIR  (90 BASE) MCG/ACT INHALE 2 PUFFS BY MOUTH EVERY 4 HOURS AS NEEDED FOR SHORTNESS OF BREATH        Aspirin (Tablet Delayed Response) aspirin 81 MG Take 81 mg by mouth every morning.        Cholecalciferol (Tab) cholecalciferol 1000 UNIT Take 2,000 Units by mouth every day.        DiltiaZEM HCl Coated Beads (CAPSULE SR 24 HR) CARDIZEM  MG Take 1 Cap by mouth every day.        Furosemide (Tab) LASIX 80 MG Take 80 mg by mouth every day.        Metoprolol Succinate (TABLET SR 24 HR) TOPROL XL 50 MG Take 1 Tab by mouth 2 Times a Day.        Misc. Devices (Misc) Misc. Devices  This is an order for a bariatric wheelchair  Class 3 combined diastolic and systolic heart failure,  Morbid obesity, BMI 56.3            VIPIN 99 months   NPI [id number]        Mometasone Furo-Formoterol Fum (Aerosol) Mometasone Furo-Formoterol Fum 200-5 MCG/ACT Inhale 2 Puffs by mouth 2 Times a Day. Use spacer. Rinse mouth after use.        Omega-3 Fatty Acids (Cap) fish oil 1000 MG Take 1,000 mg by mouth every day.        Potassium Chloride Nancy CR (Tab CR) Kdur 20 MEQ Take 1 tablet by mouth   every day        Tiotropium Bromide Monohydrate (Aero Soln) Tiotropium Bromide Monohydrate 2.5 MCG/ACT Inhale 2 Inhalation by mouth every day. Assemble and prime.        .                 Medicines prescribed today were sent to:     [facility] - [address] [facility] [address]    Phone: [phone] Fax: [phone]    Open 24 Hours?: No    [id number] - [place], [place] - [address] [facility] [address]    [address] [place] [address]    Phone: [phone] Fax: [phone]    Open 24 Hours?: No    [facility]    [address] [place] [address]    Phone: [phone] Fax: [phone]      Medication refill instructions:       If your prescription bottle indicates you have medication refills left, it is not necessary to call your provider’s office. Please contact your pharmacy and they will refill your medication.    If your prescription bottle indicates you do not have any refills left, you may request refills at any time through one of the following ways: The online [employer] system (except Urgent Care), by calling your provider’s office, or by asking your pharmacy to contact your provider’s office with a refill request. Medication refills are processed only during regular business hours and may not be available until the next business day. Your provider may request additional information or to have a follow-up visit with you prior to refilling your medication.   *Please Note: Medication refills are assigned a new Rx number when refilled electronically. Your pharmacy may indicate that no refills were authorized even though a new prescription for the same medication is available at the pharmacy. Please request the medicine by name with the pharmacy before contacting your provider for a refill.        Your To Do List     Future Labs/Procedures Complete By Expires    MA-SCREEN MAMMO W/CAD-BILAT  As  directed 2/28/2018    OCCULT BLOOD FECES IMMUNOASSAY  As directed 3/1/2018      Referral     A referral request has been sent to our patient care coordination department. Please allow 3-5 business days for us to process this request and contact you either by phone or mail. If you do not hear from us by the 5th business day, please call us at [phone].           MyChart Status: Patient Declined

## 2017-03-01 NOTE — PROGRESS NOTES
CC: Congestive heart failure, morbid obesity, health maintenance questions, respiratory failure, impaired mobility    HPI:   Michela presents today with the following.    1. Combined systolic and diastolic congestive heart failure, NYHA class 3 (CMS-[facility])  She is still very short of breath on exertion.  She gets very tired after about 12 feet.  She is unable to ambulate consistently due to this problem.    2. Morbid obesity with BMI of 50.0-59.9, adult (CMS-HCC)  Patient has had many years struggles with morbid obesity. Discussed improved diet and reduce salt. She has been attempting to improve her eating and diet in general. Exertion is extremely limited by the obesity itself and also by her congestive heart failure.    3. Need for pneumococcal vaccination  She is due    4. Encounter for screening mammogram for breast cancer  She is due    5. Colon cancer screening  She would like to go ahead and complete this. She was thinking of colonoscopy but with her oxygen requirement I believe the anesthesia for colonoscopy is contraindicated at this time.    6. Chronic respiratory failure with hypoxia (CMS-HCC)  She continues to be oxygen dependent with 6-7 L.    7. COPD with asthma ([facility])  She has complex COPD and asthma. She feels she is doing somewhat better. She is following the regimen set by pulmonology.    8. Impaired mobility and activities of daily living  She has severely impaired mobility which is exacerbated by her congestive heart failure. Her grandson is having to push her in a wheelchair.  She needs one, her current distal ulnar. She also needs assistance from home health.      Patient Active Problem List    Diagnosis Date Noted   • Combined systolic and diastolic congestive heart failure, NYHA class 3 (CMS-[facility]) 11/26/2016     Priority: High   • Chronic atrial fibrillation (HCC) 03/21/2012     Priority: High   • Pulmonary HTN (CMS-HCC) 07/04/2016     Priority: Medium   • COPD with asthma ([facility])      Priority:  Medium   • Chronic kidney disease (CKD), stage III (moderate) 06/27/2012     Priority: Medium   • Hypertension 01/06/2014     Priority: Low   • Hyperlipidemia 04/19/2012     Priority: Low   • Edema extremities 04/19/2012     Priority: Low   • Morbid obesity with BMI of 50.0-59.9, adult (CMS-[facility])      Priority: Low   • Obstructive sleep apnea      Priority: Low   • Vitamin D deficiency disease 06/27/2012       Current Outpatient Prescriptions   Medication Sig Dispense Refill   • Misc. Devices Misc This is an order for a bariatric wheelchair  Class 3 combined diastolic and systolic heart failure,  Morbid obesity, BMI 56.3            VIPIN 99 months   NPI [id number] 1 Each 0   • furosemide (LASIX) 80 MG Tab Take 80 mg by mouth every day.     • Mometasone Furo-Formoterol Fum (DULERA) 200-5 MCG/ACT Aerosol Inhale 2 Puffs by mouth 2 Times a Day. Use spacer. Rinse mouth after use. 3 Inhaler 3   • Tiotropium Bromide Monohydrate (SPIRIVA RESPIMAT) 2.5 MCG/ACT Aero Soln Inhale 2 Inhalation by mouth every day. Assemble and prime. 3 Inhaler 3   • metoprolol SR (TOPROL XL) 50 MG TABLET SR 24 HR Take 1 Tab by mouth 2 Times a Day. 180 Tab 3   • diltiazem CD (CARTIA XT) 180 MG CAPSULE SR 24 HR Take 1 Cap by mouth every day. 90 Cap 3   • potassium chloride SA (K-DUR) 20 MEQ Tab CR Take 1 tablet by mouth  every day 90 Tab 0   • PROAIR  (90 BASE) MCG/ACT Aero Soln inhalation aerosol INHALE 2 PUFFS BY MOUTH EVERY 4 HOURS AS NEEDED FOR SHORTNESS OF BREATH 1 Inhaler 5   • Omega-3 Fatty Acids (FISH OIL) 1000 MG Cap capsule Take 1,000 mg by mouth every day.     • aspirin EC 81 MG EC tablet Take 81 mg by mouth every morning.     • vitamin D (CHOLECALCIFEROL) 1000 UNIT TABS Take 2,000 Units by mouth every day.       No current facility-administered medications for this visit.         Allergies as of 02/28/2017 - Jose Antonio as Reviewed 02/28/2017   Allergen Reaction Noted   • Angiotensin receptor blockers  03/19/2012   • Codeine  12/05/2011  "  • Crestor [rosuvastatin calcium] Myalgia 03/19/2012   • Lipitor [atorvastatin calcium] Myalgia 03/19/2012   • Niacin Itching 03/19/2012   • Pradaxa  07/16/2014   • Red yeast rice [misc ge hmg coa reduct inhib] Myalgia 07/08/2014   • Statins [hmg-coa-r inhibitors]  12/02/2011   • Hydrocodone Unspecified 07/05/2016        ROS: As per HPI.    /90 mmHg  Pulse 70  Temp(Src) 36.2 °C (97.2 °F)  Resp 16  Ht 1.702 m (5' 7.01\")  SpO2 96% on 7 L oxygen via nasal cannula at rest.    Physical Exam:  Gen:         Alert and oriented, No apparent distress.  She is using her oxygen. She is in a borrowed bariatric wheelchair.  Neck:       No JVD appreciated. Slight retractions. No thyromegaly or neck mass appreciated.   Lungs:     Clear to auscultation bilaterally  CV:          Regular rate and rhythm. No murmurs, rubs or gallops.               Ext:          No clubbing, cyanosis, she has 1-2+ pitting edema bilaterally.      Assessment and Plan.   71 y.o. female with the following issues.    1. Combined systolic and diastolic congestive heart failure, NYHA class 3 (CMS-[facility])  Bariatric wheelchair is ordered. She will continue follow-up with cardiology.  - Misc. Devices Misc; This is an order for a bariatric wheelchair  Class 3 combined diastolic and systolic heart failure,  Morbid obesity, BMI 56.3            VIPIN 99 months   NPI [id number]  Dispense: 1 Each; Refill: 0  - REFERRAL TO HOME HEALTH    2. Morbid obesity with BMI of 50.0-59.9, adult (CMS-HCC)  Bariatric wheelchair order is placed. She needs assistance with her ADLs and evaluation by home health. Referral is placed.  - Misc. Devices Misc; This is an order for a bariatric wheelchair  Class 3 combined diastolic and systolic heart failure,  Morbid obesity, BMI 56.3            VIPIN 99 months   NPI [id number]  Dispense: 1 Each; Refill: 0  - REFERRAL TO HOME HEALTH    3. Need for pneumococcal vaccination  Administered today  - PNEUMOCOCCAL POLYSACCHARIDE VACCINE " 23-VALENT =>1YO SQ/IM    4. Encounter for screening mammogram for breast cancer  Mammogram ordered discussed and placed  - MA-SCREEN MAMMO W/CAD-BILAT; Future    5. Colon cancer screening  Fit test ordered discussed and placed  - OCCULT BLOOD FECES IMMUNOASSAY; Future    6. Chronic respiratory failure with hypoxia (CMS-HCC)  She needs to continue 24 7 oxygen supplementation. She also needs further assistance from home health and evaluation of her home and assistance with ADLs and medication.  - REFERRAL TO HOME HEALTH    7. COPD with asthma ([facility])  Home health referral discussed and placed. She will continue with pulmonology.  - REFERRAL TO HOME HEALTH    8. Impaired mobility and activities of daily living  Home health referral discussed and placed  - REFERRAL TO HOME HEALTH

## 2017-03-01 NOTE — TELEPHONE ENCOUNTER
1. Caller Name: samuel                      Call Back Number: [phone]    2. Message: RN from [facility] called and states that pt would like to go back on home health but they need a referral. Per RN this is needed ASAP and is requesting an urgent referral. Please advise. Thank you.    3. Patient approves office to leave a detailed voicemail/MyChart message: yes

## 2017-03-03 PROBLEM — I50.9 CHRONIC CONGESTIVE HEART FAILURE (HCC): Status: ACTIVE | Noted: 2017-01-01

## 2017-03-03 PROBLEM — J44.9 CHRONIC OBSTRUCTIVE PULMONARY DISEASE (HCC): Status: ACTIVE | Noted: 2017-01-01

## 2017-03-03 PROBLEM — Z99.81 OXYGEN DEPENDENT: Status: ACTIVE | Noted: 2017-01-01

## 2017-03-03 NOTE — PROGRESS NOTES
Chief Complaint   Patient presents with   • Follow-Up       HPI:  Michela Humphries is a 71 y.o. year old female here today for follow-up on COPD and JOHNSON.  Last OV was 3/15/16 with Abbie GOODWIN. PFT 3/1/16 indicated FEV1 of 0.96 L or 45% predicted with an  FEV1/FVC ratio of 63 and a DLCO 45%. She is oxygen dependent on 5 LPM - today she required 6L. She is requesting new order for oxygen. She was recently admitted to [facility] for A fib., CHF exacerbation and AECOPD. She was discharged home on hospice who has taken her oxygen supplies. She needs to be set up with [facility] again STAT.  Chest x-ray 12/1/2016 indicated congestive heart failure. Repeat chest x-ray 12/6/2016 showed improved pulmonary edema and stable cardiomegaly. Nuclear medicine lung VQ scan on 1216 and 16 K large perfusion abnormality involving nearly the entire right lung with sparing of the posterior basal segment. This is larger than the ventilation abnormality. PT was possible. However, given such a large area of involvement in the left lung with normal right lung, this would be atypical for PE. CT angiogram is recommended. Extremity ultrasound duplex venous study on 12/6/2016 was a negative exam. No evidence of DVT. EKG on 11/30/2016 showed A. fib with ventricular premature complexes, abnormal R wave progression and rate of 141. Echo 12/2/2016 indicated LVEF of 65-70%, borderline concentric left ventricular hypertrophy, mildly dilated left atrium with mild regurgitation, mild aortic valve sclerosis without stenosis, mildly enlarged right ventricle with normal function. RVSP 54 mmHg. Patient has followed up with Cardiology since and remains on diuretics. She remains anticoagulated with chronic BLE 2+ pedal edema. She's currently using Dulera 200 µg 2 puffs twice a day, Spiriva rest and that 2.5 µg 2 puffs once daily, nebulizer as needed and pro-air HFA inhaler as needed. Alpha 1 3/1/16 was normal. Today she notes stable dyspnea on exertion,  no cough/phlegm/wheezing. She denies fever, chills, night sweats, chest pain, stomach issues, GERD, headaches, dizziness or hemoptysis.     She will run out of oxygen within 2-3hrs due to only having oxygen tanks on hand and no concentrator. We will keep her in office, but she requests deliver to home. We will set this up STAT.    She is compliant using her BiPAP with 5 L oxygen bleed in. She does not have compliance card today. She notes no difficulty with use. She needs update mask/supplies.    Multi-Ox Readings     Oxygen at rest on 4L   86%  Multi Ox #1     O2 sat % at rest     O2 sat % on exertion     O2 sat average on exertion     Multi Ox #2 6 LPM   O2 sat % at rest 90   O2 sat % on exertion     O2 sat average on exertion       Oxygen Use 5   Oxygen Frequency Nocturnal in conjunction with BIPAP   Duration of need     Is the patient mobile within the home?     CPAP Use?     BIPAP Use?     Servo Titration               ROS: As per HPI and otherwise negative if not stated.    Past Medical History   Diagnosis Date   • Atrial flutter (CMS-[facility])    • Essential hypertension, benign    • Chronic bronchitis (CMS-[facility])    • Diastolic heart failure    • Echocardiogram abnormal      10/22/11 EF 6-65%, 2plus MR, 1 plus TR, Normal pulmonary  pressures.   • Morbid obesity (CMS-[facility])    • Primary pulmonary hypertension (CMS-[facility])    • COPD with asthma (CMS-[facility])    • Snoring    • Unspecified sleep apnea      uses Bipap   • Anesthesia      ponv, headache   • Congestive heart failure (CMS-[facility])    • CKD (chronic kidney disease) stage 3, GFR 30-59 ml/min    • Unspecified cataract    • Aspirin long-term use      low dose, per Dr. Butler   • Colon adenomas    • Hypoxemia        Past Surgical History   Procedure Laterality Date   • Recovery  1/16/2012     Performed by SURGERY, CATH-RECOVERY at SURGERY SAME DAY [facility] ORS   • Shoulder surgery     • Knee arthroscopy     • Colonoscopy  3/30/2016     Procedure: COLONOSCOPY WITH BIOPSY;   "Surgeon: Daniel Eli M.D.;  Location: SURGERY [facility];  Service:    • Recovery  6/16/2016     Procedure: CATH LAB, [facility], ;  Surgeon: Anna Surgery;  Location: SURGERY PRE-POST PROC UNIT [facility];  Service:        Family History   Problem Relation Age of Onset   • Heart Disease Neg Hx    • Other Mother      brain aneurysm/trauma   • Dementia Sister      early   • Hypertension Sister    • Hyperlipidemia Sister    • Cancer Brother      jaw       Social History     Social History   • Marital Status: Single     Spouse Name: N/A   • Number of Children: N/A   • Years of Education: N/A     Occupational History   • Not on file.     Social History Main Topics   • Smoking status: Former Smoker -- 0.50 packs/day for 3 years     Types: Cigarettes     Quit date: 03/01/1972   • Smokeless tobacco: Never Used   • Alcohol Use: No   • Drug Use: No   • Sexual Activity: Not on file     Other Topics Concern   • Not on file     Social History Narrative       Allergies as of 03/03/2017 - Jose Antonio as Reviewed 03/03/2017   Allergen Reaction Noted   • Angiotensin receptor blockers  03/19/2012   • Codeine  12/05/2011   • Crestor [rosuvastatin calcium] Myalgia 03/19/2012   • Lipitor [atorvastatin calcium] Myalgia 03/19/2012   • Niacin Itching 03/19/2012   • Pradaxa  07/16/2014   • Red yeast rice [misc ge hmg coa reduct inhib] Myalgia 07/08/2014   • Statins [hmg-coa-r inhibitors]  12/02/2011   • Hydrocodone Unspecified 07/05/2016        @Vital signs for this encounter:  Filed Vitals:    03/03/17 1443   Height: 1.702 m (5' 7.01\")   Weight: 165.109 kg (364 lb)   Weight % change since last entry.: 0 %   BP: 124/84   Pulse: 74   BMI (Calculated): 57   Resp: 14   O2 sat % on O2: 86 %   O2 Flow Rate (L/min): 4       Current medications as of today   Current Outpatient Prescriptions   Medication Sig Dispense Refill   • Misc. Devices Misc This is an order for a bariatric wheelchair  Class 3 combined diastolic and systolic heart " failure,  Morbid obesity, BMI 56.3            VIPIN 99 months   NPI [id number] 1 Each 0   • furosemide (LASIX) 80 MG Tab Take 80 mg by mouth every day.     • Mometasone Furo-Formoterol Fum (DULERA) 200-5 MCG/ACT Aerosol Inhale 2 Puffs by mouth 2 Times a Day. Use spacer. Rinse mouth after use. 3 Inhaler 3   • Tiotropium Bromide Monohydrate (SPIRIVA RESPIMAT) 2.5 MCG/ACT Aero Soln Inhale 2 Inhalation by mouth every day. Assemble and prime. 3 Inhaler 3   • metoprolol SR (TOPROL XL) 50 MG TABLET SR 24 HR Take 1 Tab by mouth 2 Times a Day. 180 Tab 3   • diltiazem CD (CARTIA XT) 180 MG CAPSULE SR 24 HR Take 1 Cap by mouth every day. 90 Cap 3   • potassium chloride SA (K-DUR) 20 MEQ Tab CR Take 1 tablet by mouth  every day 90 Tab 0   • PROAIR  (90 BASE) MCG/ACT Aero Soln inhalation aerosol INHALE 2 PUFFS BY MOUTH EVERY 4 HOURS AS NEEDED FOR SHORTNESS OF BREATH 1 Inhaler 5   • Omega-3 Fatty Acids (FISH OIL) 1000 MG Cap capsule Take 1,000 mg by mouth every day.     • aspirin EC 81 MG EC tablet Take 81 mg by mouth every morning.     • vitamin D (CHOLECALCIFEROL) 1000 UNIT TABS Take 2,000 Units by mouth every day.       No current facility-administered medications for this visit.         Physical Exam:   Gen:           Alert and oriented, No apparent distress. Mood and affect appropriate, normal interaction with examiner.  Eyes:          PERRL, EOM intact, sclere white, conjunctive moist.  Ears:          Not examined.   Hearing:     Grossly intact.  Nose:          Normal, no lesions or deformities.  Dentition:    Good dentition.  Oropharynx:   Tongue normal, posterior pharynx without erythema or exudate.  Mallampati Classification: 3  Neck:        Supple, trachea midline, no masses.  Respiratory Effort: No intercostal retractions or use of accessory muscles.   Lung Auscultation:      Clear to auscultation bilaterally but diminished t/o; no rales, rhonchi or wheezing.  CV:            A Fib with distant heart tones.   Abd:            Not examined. Obese  Lymphadenopathy: Not examined.  Gait and Station: In wheelchair.  Digits and Nails: No clubbing, cyanosis, petechiae, or nodes.   Cranial Nerves: II-XII grossly intact.  Skin:        No rashes, lesions or ulcers noted.               Ext:           No cyanosis but BLE 2+ pedal edema.      Assessment:  1. Chronic obstructive pulmonary disease, unspecified COPD type (CMS-[facility])  DME O2 NEW SET UP   2. Oxygen dependent  DME O2 NEW SET UP   3. Chronic congestive heart failure, unspecified congestive heart failure type (CMS-[facility])     4. Pulmonary HTN (CMS-[facility])  DME O2 NEW SET UP   5. Morbid obesity with BMI of 50.0-59.9, adult (CMS-HCC)     6. Obstructive sleep apnea     7. Essential hypertension     8. Hyperlipidemia, unspecified hyperlipidemia type     9. Chronic atrial fibrillation (HCC)     10. Chronic kidney disease (CKD), stage III (moderate)         Immunizations:    Flu:2016  Pneumovax 23:2015  Prevnar 13:2017    Plan:  1. DME o2 order STAT 6L oxygen 24/7. Patient will continue to benefit from this.  2. Continue inhaler regimen.  3. Continue BIPAP w/5L oxygen bleed in.   4. Discussed sleep and respiratory hygiene.  5. Follow up at pending appt with compliance card, sooner if needed.

## 2017-03-03 NOTE — PATIENT INSTRUCTIONS
1. Continue inhalers.  2. Use 6L oxygen 24/7.  3. Continue BIPAP at night.  4. Follow up at pending appt. With Puja GOODWIN, sooner if needed.

## 2017-03-03 NOTE — MR AVS SNAPSHOT
"        Michela Mustafa Yandel   3/3/2017 3:00 PM   Office Visit   MRN: [id number]    Department:  [facility]   Dept Phone:  [phone]    Description:  Female : 1945   Provider:  CARLOS Cline           Reason for Visit     Follow-Up           Allergies as of 3/3/2017     Allergen Noted Reactions    Angiotensin Receptor Blockers 2012       Codeine 2011       Crestor [Rosuvastatin Calcium] 2012   Myalgia    Lipitor [Atorvastatin Calcium] 2012   Myalgia    Niacin 2012   Itching    Pradaxa 2014       Severe bleeding tendency    Red Yeast Rice [Misc Terri Hmg Coa Reduct Inhib] 2014   Myalgia    Statins [Hmg-Coa-R Inhibitors] 2011       Hydrocodone 2016   Unspecified    Headache. No reaction to Tylenol.      You were diagnosed with     Chronic obstructive pulmonary disease, unspecified COPD type (CMS-HCC)   [5234679]       Oxygen dependent   [661737]       Chronic congestive heart failure, unspecified congestive heart failure type (CMS-HCC)   [1393465]       Pulmonary HTN (CMS-HCC)   [293605]       Morbid obesity with BMI of 50.0-59.9, adult (CMS-HCC)   [424587]       Obstructive sleep apnea   [545761]       Essential hypertension   [1080555]       Hyperlipidemia, unspecified hyperlipidemia type   [7494100]       Chronic atrial fibrillation (CMS-HCC)   [729113]       Chronic kidney disease (CKD), stage III (moderate)   [223339]         Vital Signs     Blood Pressure Pulse Respirations Height Weight Body Mass Index    124/84 mmHg 74 14 1.702 m (5' 7.01\") 165.109 kg (364 lb) 57.00 kg/m2    Smoking Status                   Former Smoker           Basic Information     Date Of Birth Sex Race Ethnicity Preferred Language    1945 Female Black or  Non- English      Your appointments     Mar 16, 2017 10:40 AM   Established Patient Pul with [facility] (--)    [address]  [address]  [place] [id number] [phone]              Problem List              ICD-10-CM Priority Class Noted - Resolved    Morbid obesity with BMI of 50.0-59.9, adult (CMS-HCC) E66.01, Z68.43 Low  Unknown - Present    Obstructive sleep apnea G47.33 Low  Unknown - Present    Chronic atrial fibrillation (HCC) (Chronic) I48.2 High  3/21/2012 - Present    Hyperlipidemia (Chronic) E78.5 Low  4/19/2012 - Present    Edema extremities R60.0 Low  4/19/2012 - Present    Chronic kidney disease (CKD), stage III (moderate) (Chronic) N18.3 Medium  6/27/2012 - Present    Vitamin D deficiency disease E55.9   6/27/2012 - Present    Hypertension (Chronic) I10 Low  1/6/2014 - Present    COPD with asthma (HCC) (Chronic) J44.9, J45.909 Medium  Unknown - Present    Pulmonary HTN (CMS-HCC) (Chronic) I27.2 Medium  7/4/2016 - Present    Combined systolic and diastolic congestive heart failure, NYHA class 3 (CMS-HCC) I50.40 High  11/26/2016 - Present    Chronic obstructive pulmonary disease (CMS-HCC) J44.9   3/3/2017 - Present    Oxygen dependent Z99.81   3/3/2017 - Present    Chronic congestive heart failure (CMS-HCC) I50.9   3/3/2017 - Present      Health Maintenance        Date Due Completion Dates    COLON CANCER SCREENING ANNUAL FIT 2/24/2017 2/24/2016, 7/17/2014    IMM DTaP/Tdap/Td Vaccine (1 - Tdap) 4/1/2018 (Originally 8/23/1964) ---    IMM ZOSTER VACCINE 4/1/2018 (Originally 8/23/2005) ---    BONE DENSITY 1/6/2022 (Originally 10/19/2014) 10/19/2009    COLONOSCOPY 4/11/2019 4/11/2016, 3/30/2016            Current Immunizations     13-VALENT PCV PREVNAR 10/1/2015    Influenza TIV (IM) 10/1/2015, 10/1/2013, 9/1/2012    Influenza Vaccine 10/18/2011    Influenza Vaccine Adult HD 10/11/2016    Pneumococcal Vaccine (UF)Historical Data 1/18/2011    Pneumococcal polysaccharide vaccine (PPSV-23) 2/28/2017      Below and/or attached are the medications your provider expects you to take. Review all of your home medications and newly  ordered medications with your provider and/or pharmacist. Follow medication instructions as directed by your provider and/or pharmacist. Please keep your medication list with you and share with your provider. Update the information when medications are discontinued, doses are changed, or new medications (including over-the-counter products) are added; and carry medication information at all times in the event of emergency situations     Allergies:  ANGIOTENSIN RECEPTOR BLOCKERS - (reactions not documented)     CODEINE - (reactions not documented)     CRESTOR - Myalgia     LIPITOR - Myalgia     NIACIN - Itching     PRADAXA - (reactions not documented)     RED YEAST RICE - Myalgia     STATINS - (reactions not documented)     HYDROCODONE - Unspecified               Medications  Valid as of: March 03, 2017 -  3:36 PM    Generic Name Brand Name Tablet Size Instructions for use    Albuterol Sulfate (Aero Soln) PROAIR  (90 BASE) MCG/ACT INHALE 2 PUFFS BY MOUTH EVERY 4 HOURS AS NEEDED FOR SHORTNESS OF BREATH        Aspirin (Tablet Delayed Response) aspirin 81 MG Take 81 mg by mouth every morning.        Cholecalciferol (Tab) cholecalciferol 1000 UNIT Take 2,000 Units by mouth every day.        DiltiaZEM HCl Coated Beads (CAPSULE SR 24 HR) CARDIZEM  MG Take 1 Cap by mouth every day.        Furosemide (Tab) LASIX 80 MG Take 80 mg by mouth every day.        Metoprolol Succinate (TABLET SR 24 HR) TOPROL XL 50 MG Take 1 Tab by mouth 2 Times a Day.        Misc. Devices (Misc) Misc. Devices  This is an order for a bariatric wheelchair  Class 3 combined diastolic and systolic heart failure,  Morbid obesity, BMI 56.3            VIPIN 99 months   NPI [id number]        Mometasone Furo-Formoterol Fum (Aerosol) Mometasone Furo-Formoterol Fum 200-5 MCG/ACT Inhale 2 Puffs by mouth 2 Times a Day. Use spacer. Rinse mouth after use.        Omega-3 Fatty Acids (Cap) fish oil 1000 MG Take 1,000 mg by mouth every day.         Potassium Chloride Nancy CR (Tab CR) Kdur 20 MEQ Take 1 tablet by mouth  every day        Tiotropium Bromide Monohydrate (Aero Soln) Tiotropium Bromide Monohydrate 2.5 MCG/ACT Inhale 2 Inhalation by mouth every day. Assemble and prime.        .                 Medicines prescribed today were sent to:     [facility] - [address]    [address] [facility] [address] [facility] [address]    Phone: [phone] Fax: [phone]    Open 24 Hours?: No    [facility] - [place], [place] - [address] [facility] [address]    [address] [place] [address]    Phone: [phone] Fax: [phone]    Open 24 Hours?: No    [facility] [place]    [address] [place] [address]    Phone: [phone] Fax: [phone]      Medication refill instructions:       If your prescription bottle indicates you have medication refills left, it is not necessary to call your provider’s office. Please contact your pharmacy and they will refill your medication.    If your prescription bottle indicates you do not have any refills left, you may request refills at any time through one of the following ways: The online [employer] system (except Urgent Care), by calling your provider’s office, or by asking your pharmacy to contact your provider’s office with a refill request. Medication refills are processed only during regular business hours and may not be available until the next business day. Your provider may request additional information or to have a follow-up visit with you prior to refilling your medication.   *Please Note: Medication refills are assigned a new Rx number when refilled electronically. Your pharmacy may indicate that no refills were authorized even though a new prescription for the same medication is available at the pharmacy. Please request the medicine by name with the pharmacy before contacting your provider for a refill.        Instructions    1. Continue  inhalers.  2. Use 6L oxygen 24/7.  3. Continue BIPAP at night.  4. Follow up at pending appt. With Puja GOODWIN, sooner if needed.          MyChart Status: Patient Declined

## 2017-03-09 NOTE — TELEPHONE ENCOUNTER
Pt is requesting a new rx for Levalbuterol 1.25MG / 3ML neb.  Not showing on formulary nor ever prescribed    Last OV: 3/3/17  Next OV: 3/16/17  COPD with Asthma  Levalbuterol 1.25MG

## 2017-03-14 NOTE — ED NOTES
Bib remsa, report pt w/ epistaxis at 0630, pt on 6 L O2 at home, more when moving around, took off home O2 to stop epistaxis, 62% on RA; remsa placed pt on nrb sat up to 99%, unable to control bleeding. Pt declines iv upon arrival.

## 2017-03-14 NOTE — DISCHARGE INSTRUCTIONS
Nosebleed  Nosebleeds are common. They are due to a crack in the inside lining of your nose (mucous membrane) or from a small blood vessel that starts to bleed. Nosebleeds can be caused by many conditions, such as injury, infections, dry mucous membranes or dry climate, medicines, nose picking, and home heating and cooling systems. Most nosebleeds come from blood vessels in the front of your nose.  HOME CARE INSTRUCTIONS   · Try controlling your nosebleed by pinching your nostrils gently and continuously for at least 10 minutes.  · Avoid blowing or sniffing your nose for a number of hours after having a nosebleed.  · Do not put gauze inside your nose yourself. If your nose was packed by your health care provider, try to maintain the pack inside of your nose until your health care provider removes it.  ¨ If a gauze pack was used and it starts to fall out, gently replace it or cut off the end of it.  ¨ If a balloon catheter was used to pack your nose, do not cut or remove it unless your health care provider has instructed you to do that.  · Avoid lying down while you are having a nosebleed. Sit up and lean forward.  · Use a nasal spray decongestant to help with a nosebleed as directed by your health care provider.  · Do not use petroleum jelly or mineral oil in your nose. These can drip into your lungs.  · Maintain humidity in your home by using less air conditioning or by using a humidifier.  · Aspirin and blood thinners make bleeding more likely. If you are prescribed these medicines and you suffer from nosebleeds, ask your health care provider if you should stop taking the medicines or adjust the dose. Do not stop medicines unless directed by your health care provider  · Resume your normal activities as you are able, but avoid straining, lifting, or bending at the waist for several days.  · If your nosebleed was caused by dry mucous membranes, use over-the-counter saline nasal spray or gel. This will keep the  mucous membranes moist and allow them to heal. If you must use a lubricant, choose the water-soluble variety. Use it only sparingly, and do not use it within several hours of lying down.  · Keep all follow-up visits as directed by your health care provider. This is important.  SEEK MEDICAL CARE IF:  · You have a fever.  · You get frequent nosebleeds.  · You are getting nosebleeds more often.  SEEK IMMEDIATE MEDICAL CARE IF:  · Your nosebleed lasts longer than 20 minutes.  · Your nosebleed occurs after an injury to your face, and your nose looks crooked or broken.  · You have unusual bleeding from other parts of your body.  · You have unusual bruising on other parts of your body.  · You feel light-headed or you faint.  · You become sweaty.  · You vomit blood.  · Your nosebleed occurs after a head injury.     This information is not intended to replace advice given to you by your health care provider. Make sure you discuss any questions you have with your health care provider.     Document Released: 09/27/2006 Document Revised: 01/08/2016 Document Reviewed: 08/03/2015  [employer] Interactive Patient Education ©2016 [employer]

## 2017-03-14 NOTE — ED PROVIDER NOTES
ED Provider Note    Scribed for Randolph Snyder D.O. by Anjali Blankenship. 3/14/2017  9:14 AM    Primary care provider: Abbie Zimmerman M.D.  Means of arrival: EMS  History obtained from: Patient  History limited by: None     CHIEF COMPLAINT  Chief Complaint   Patient presents with   • Epistaxis     HPI  Michela Humphries is a 71 y.o. female who presents to the Emergency Department by ambulance for epistaxis onset 6:30 AM this morning. She reports having similar symptoms previously and had her nares packed to control the bleeding. EMS was unable to control the bleeding, prompting her transport. Patient takes 81 mg of aspirin per day. She has history of COPD and is oxygen dependent. Patient was found on 6 liters of oxygen by EMS and removed her oxygen to control her bleeding, she desaturated to 62% on room air. After being placed on a non-rebreather, the patient improved to 99%.    REVIEW OF SYSTEMS  Pertinent positives include epistaxis. Pertinent negatives include dizziness, no loss of sensation or strength in arms or legs, no recent trauma.     PAST MEDICAL HISTORY  Past Medical History   Diagnosis Date   • Atrial flutter (CMS-HCC)    • Essential hypertension, benign    • Chronic bronchitis (CMS-HCC)    • Diastolic heart failure    • Echocardiogram abnormal      10/22/11 EF 6-65%, 2plus MR, 1 plus TR, Normal pulmonary  pressures.   • Morbid obesity (CMS-HCC)    • Primary pulmonary hypertension (CMS-HCC)    • COPD with asthma (CMS-HCC)    • Snoring    • Unspecified sleep apnea      uses Bipap   • Anesthesia      ponv, headache   • Congestive heart failure (CMS-HCC)    • CKD (chronic kidney disease) stage 3, GFR 30-59 ml/min    • Unspecified cataract    • Aspirin long-term use      low dose, per Dr. Butler   • Colon adenomas    • Hypoxemia        SURGICAL HISTORY  Past Surgical History   Procedure Laterality Date   • Recovery  1/16/2012     Performed by SURGERY, CATH-RECOVERY at SURGERY SAME DAY [facility] ORS   •  Shoulder surgery     • Knee arthroscopy     • Colonoscopy  3/30/2016     Procedure: COLONOSCOPY WITH BIOPSY;  Surgeon: Daniel Eli M.D.;  Location: SURGERY [facility];  Service:    • Recovery  6/16/2016     Procedure: CATH LAB, [facility], ;  Surgeon: Recoveryonly Surgery;  Location: SURGERY PRE-POST PROC UNIT [facility];  Service:         SOCIAL HISTORY  Social History   Substance Use Topics   • Smoking status: Former Smoker -- 0.50 packs/day for 3 years     Types: Cigarettes     Quit date: 03/01/1972   • Smokeless tobacco: Never Used   • Alcohol Use: No      History   Drug Use No       FAMILY HISTORY  Family History   Problem Relation Age of Onset   • Heart Disease Neg Hx    • Other Mother      brain aneurysm/trauma   • Dementia Sister      early   • Hypertension Sister    • Hyperlipidemia Sister    • Cancer Brother      jaw       CURRENT MEDICATIONS    Current Outpatient Prescriptions on File Prior to Encounter   Medication Sig Dispense Refill   • levalbuterol (XOPENEX) 1.25 MG/3ML Nebu Soln INHALE 3 ML BY MOUTH VIA NEBULIZER EVERY 4 HOURS AS NEEDED FOR SHORTNESS OF BREATH 1620 mL 3   • metoprolol SR (TOPROL XL) 50 MG TABLET SR 24 HR Take 1 Tab by mouth every day. 90 Tab 3   • Misc. Devices Misc This is an order for a bariatric wheelchair  Class 3 combined diastolic and systolic heart failure,  Morbid obesity, BMI 56.3            VIPIN 99 months   NPI [id number] 1 Each 0   • furosemide (LASIX) 80 MG Tab Take 80 mg by mouth every day.     • Mometasone Furo-Formoterol Fum (DULERA) 200-5 MCG/ACT Aerosol Inhale 2 Puffs by mouth 2 Times a Day. Use spacer. Rinse mouth after use. 3 Inhaler 3   • Tiotropium Bromide Monohydrate (SPIRIVA RESPIMAT) 2.5 MCG/ACT Aero Soln Inhale 2 Inhalation by mouth every day. Assemble and prime. 3 Inhaler 3   • diltiazem CD (CARTIA XT) 180 MG CAPSULE SR 24 HR Take 1 Cap by mouth every day. 90 Cap 3   • potassium chloride SA (K-DUR) 20 MEQ Tab CR Take 1 tablet by mouth  every day 90  "Tab 0   • PROAIR  (90 BASE) MCG/ACT Aero Soln inhalation aerosol INHALE 2 PUFFS BY MOUTH EVERY 4 HOURS AS NEEDED FOR SHORTNESS OF BREATH 1 Inhaler 5   • Omega-3 Fatty Acids (FISH OIL) 1000 MG Cap capsule Take 1,000 mg by mouth every day.     • aspirin EC 81 MG EC tablet Take 81 mg by mouth every morning.     • vitamin D (CHOLECALCIFEROL) 1000 UNIT TABS Take 2,000 Units by mouth every day.          ALLERGIES  Allergies   Allergen Reactions   • Angiotensin Receptor Blockers    • Codeine    • Crestor [Rosuvastatin Calcium] Myalgia   • Lipitor [Atorvastatin Calcium] Myalgia   • Niacin Itching   • Pradaxa      Severe bleeding tendency   • Red Yeast Rice [Misc Terri Hmg Coa Reduct Inhib] Myalgia   • Statins [Hmg-Coa-R Inhibitors]    • Hydrocodone Unspecified     Headache. No reaction to Tylenol.       PHYSICAL EXAM  VITAL SIGNS: /71 mmHg  Pulse 84  Temp(Src) 37 °C (98.6 °F)  Resp 20  Ht 1.702 m (5' 7\")  Wt 176.903 kg (390 lb)  BMI 61.07 kg/m2  SpO2 99%    Nursing notes and vitals reviewed.  Constitutional: Obese female, No acute distress, Non-toxic appearance.   Eyes: PERRLA, EOMI, Conjunctiva normal, No discharge.   Nose: Bleeding from bilateral nares with packing in place, direct visualization after clearing clots, she has to discrete lesions on the anterior septum bilaterally slight bleeding, no arterial bleeding.   Cardiovascular: Normal heart rate, Normal rhythm, No murmurs, No rubs, No gallops.   Thorax & Lungs: No respiratory distress, No rales, No rhonchi, No wheezing, No chest tenderness.   Skin: Warm, Dry, No erythema, No rash.   Neurologic: Alert & oriented x 3, Normal motor function, Normal sensory function, No focal deficits noted.  Psychiatric: Affect normal for clinical presentation.    PROCEDURES    Epistaxis Procedure Note    Indication: Bleeding    Pre-medication: epinephrine    Procedure: The patient was positioned appropriately and the nares were cleared as well as possible. The " "patient received Afrin spray, lidocaine with epinephrine pledgets followed by the below procedure. The bleeding site was localized to the right nare in the superior region and cauterized with silver nitrate.  Hemostasis was obtained.    The patient tolerated the procedure well.    Complications: None    COURSE & MEDICAL DECISION MAKING  Pertinent Labs & Imaging studies reviewed. (See chart for details)    9:14 AM - Patient seen and examined at bedside. Administered Afrin at bedside. See epistaxis procedure note above for further details. Clamp placed and I informed the patient I will return to determine if her bleeding is controlled.     10:06 AM Recheck: Patient re-evaluated at beside. Cauterized patient's nare.     10:57 AM Rechecked patient at bedside. Her bleeding is controlled and she is breathing well while maintaining good oxygen saturation on her nasal cannula. Patient is ready for discharge. I gave the patient a nasal clamp and advised her to utilize it and return to the ED if her epistaxis returns. She was agreeable and had the opportunity for questions.     Vitals upon discharge:   /71 mmHg  Pulse 79  Temp(Src) 37 °C (98.6 °F)  Resp 20  Ht 1.702 m (5' 7\")  Wt 176.903 kg (390 lb)  BMI 61.07 kg/m2  SpO2 95%     This is a charming 71 y.o. female that presents with epistaxis of bilateral nares. The patient underwent epistaxis control without difficulty. She is to follow-up with Dr. James for further intervention or evaluation. Strict return precautions have been given for increasing bleeding..    The patient will return for new or worsening symptoms and is stable at the time of discharge.    The patient is referred to a primary physician for blood pressure management, diabetic screening, and for all other preventative health concerns.    DISPOSITION:  Patient will be discharged home in stable condition.    FOLLOW UP:  [facility], Emergency Dept  [address] [facility] " [address]  [phone]    If symptoms worsen    Feng James M.D.  [address] [place] [id number]  [phone]    Schedule an appointment as soon as possible for a visit  As needed      FINAL IMPRESSION  1. Epistaxis    2. Chronic obstructive pulmonary disease, unspecified COPD type (CMS-HCC)          IAnjali (Scribe), am scribing for, and in the presence of, Randolph Snyder D.O    Electronically signed by: Anjali Blankenship (Scribe), 3/14/2017    IRandolph D.O. personally performed the services described in this documentation, as scribed by Anjali Blankenship in my presence, and it is both accurate and complete.    The note accurately reflects work and decisions made by me.  Randolph Snyder  3/14/2017  2:58 PM

## 2017-03-14 NOTE — TELEPHONE ENCOUNTER
Please alert the RN that she does have an appointment with Emelyn Ramsey on the 16th. She is with the pulmonary group but they will be checking blood pressure as well. I would like to see the patient within the next few weeks.  I noticed that her metoprolol is now written for twice a day. This should be 50 mg XL once daily only.

## 2017-03-14 NOTE — ED AVS SNAPSHOT
Home Care Instructions                                                                                                                Michela Humphries   MRN: [id number]    Department:  [facility], Emergency Dept   Date of Visit:  3/14/2017            [facility], Emergency Dept    [address] [address]    Phone:  [phone]      You were seen by     Randolph Snyder D.O.      Your Diagnosis Was     Epistaxis     R04.0       Follow-up Information     1. Follow up with [facility], Emergency Dept.    Specialty:  Emergency Medicine    Why:  If symptoms worsen    Contact information    [address] [address] [phone]        2. Schedule an appointment as soon as possible for a visit with Feng James M.D..    Specialty:  Otolaryngology    Why:  As needed    Contact information    [address] [place] [address] [phone]        Medication Information     Review all of your home medications and newly ordered medications with your primary doctor and/or pharmacist as soon as possible. Follow medication instructions as directed by your doctor and/or pharmacist.     Please keep your complete medication list with you and share with your physician. Update the information when medications are discontinued, doses are changed, or new medications (including over-the-counter products) are added; and carry medication information at all times in the event of emergency situations.               Medication List      ASK your doctor about these medications        Instructions    Morning Afternoon Evening Bedtime    aspirin 81 MG EC tablet        Take 81 mg by mouth every morning.   Dose:  81 mg                        diltiazem  MG Cp24   Commonly known as:  CARTIA XT        Take 1 Cap by mouth every day.   Dose:  180 mg                        fish oil 1000 MG Caps capsule        Take 1,000 mg by mouth every day.   Dose:  1000  mg                        furosemide 80 MG Tabs   Commonly known as:  LASIX        Take 80 mg by mouth every day.   Dose:  80 mg                        levalbuterol 1.25 MG/3ML Nebu   Commonly known as:  XOPENEX        Doctor's comments:  **Patient requests 90 days supply**   INHALE 3 ML BY MOUTH VIA NEBULIZER EVERY 4 HOURS AS NEEDED FOR SHORTNESS OF BREATH                        metoprolol SR 50 MG Tb24   Commonly known as:  TOPROL XL        Doctor's comments:  Please note, once a day only   Take 1 Tab by mouth every day.   Dose:  50 mg                        Misc. 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 Use spacer. Rinse mouth after use.   Dose:  2 Puff                        potassium chloride SA 20 MEQ Tbcr   Commonly known as:  Kdur        Take 1 tablet by mouth  every day                        PROAIR  (90 BASE) MCG/ACT Aers inhalation aerosol   Generic drug:  albuterol        INHALE 2 PUFFS BY MOUTH EVERY 4 HOURS AS NEEDED FOR SHORTNESS OF BREATH                        Tiotropium Bromide Monohydrate 2.5 MCG/ACT Aers   Commonly known as:  SPIRIVA RESPIMAT        Inhale 2 Inhalation by mouth every day. Assemble and prime.   Dose:  2 Inhalation                        vitamin D 1000 UNIT Tabs   Commonly known as:  cholecalciferol        Take 2,000 Units by mouth every day.   Dose:  2000 Units                                  Discharge Instructions       Nosebleed  Nosebleeds are common. They are due to a crack in the inside lining of your nose (mucous membrane) or from a small blood vessel that starts to bleed. Nosebleeds can be caused by many conditions, such as injury, infections, dry mucous membranes or dry  climate, medicines, nose picking, and home heating and cooling systems. Most nosebleeds come from blood vessels in the front of your nose.  HOME CARE INSTRUCTIONS   · Try controlling your nosebleed by pinching your nostrils gently and continuously for at least 10 minutes.  · Avoid blowing or sniffing your nose for a number of hours after having a nosebleed.  · Do not put gauze inside your nose yourself. If your nose was packed by your health care provider, try to maintain the pack inside of your nose until your health care provider removes it.  ¨ If a gauze pack was used and it starts to fall out, gently replace it or cut off the end of it.  ¨ If a balloon catheter was used to pack your nose, do not cut or remove it unless your health care provider has instructed you to do that.  · Avoid lying down while you are having a nosebleed. Sit up and lean forward.  · Use a nasal spray decongestant to help with a nosebleed as directed by your health care provider.  · Do not use petroleum jelly or mineral oil in your nose. These can drip into your lungs.  · Maintain humidity in your home by using less air conditioning or by using a humidifier.  · Aspirin and blood thinners make bleeding more likely. If you are prescribed these medicines and you suffer from nosebleeds, ask your health care provider if you should stop taking the medicines or adjust the dose. Do not stop medicines unless directed by your health care provider  · Resume your normal activities as you are able, but avoid straining, lifting, or bending at the waist for several days.  · If your nosebleed was caused by dry mucous membranes, use over-the-counter saline nasal spray or gel. This will keep the mucous membranes moist and allow them to heal. If you must use a lubricant, choose the water-soluble variety. Use it only sparingly, and do not use it within several hours of lying down.  · Keep all follow-up visits as directed by your health care provider. This is  important.  SEEK MEDICAL CARE IF:  · You have a fever.  · You get frequent nosebleeds.  · You are getting nosebleeds more often.  SEEK IMMEDIATE MEDICAL CARE IF:  · Your nosebleed lasts longer than 20 minutes.  · Your nosebleed occurs after an injury to your face, and your nose looks crooked or broken.  · You have unusual bleeding from other parts of your body.  · You have unusual bruising on other parts of your body.  · You feel light-headed or you faint.  · You become sweaty.  · You vomit blood.  · Your nosebleed occurs after a head injury.     This information is not intended to replace advice given to you by your health care provider. Make sure you discuss any questions you have with your health care provider.     Document Released: 09/27/2006 Document Revised: 01/08/2016 Document Reviewed: 08/03/2015  [employer] Interactive Patient Education ©2016 [employer]            Patient Information     Patient Information    Following emergency treatment: all patient requiring follow-up care must return either to a private physician or a clinic if your condition worsens before you are able to obtain further medical attention, please return to the emergency room.     Billing Information    At [facility], we work to make the billing process streamlined for our patients.  Our Representatives are here to answer any questions you may have regarding your hospital bill.  If you have insurance coverage and have supplied your insurance information to us, we will submit a claim to your insurer on your behalf.  Should you have any questions regarding your bill, we can be reached online or by phone as follows:  Online: You are able pay your bills online or live chat with our representatives about any billing questions you may have. We are here to help Monday - Friday from 8:00am to 7:30pm and 9:00am - 12:00pm on Saturdays.  Please visit [email or web address]  for more information.   Phone:   [phone] or [phone]    Please note that your emergency physician, surgeon, pathologist, radiologist, anesthesiologist, and other specialists are not employed by [facility] and will therefore bill separately for their services.  Please contact them directly for any questions concerning their bills at the numbers below:     Emergency Physician Services:  [phone]  [facility]:  [phone]  [facility]:  [phone]  [facility]:  [phone]    1. Your final bill may vary from the amount quoted upon discharge if all procedures are not complete at that time, or if your doctor has additional procedures of which we are not aware. You will receive an additional bill if you return to the Emergency Department at [facility] for suture removal regardless of the facility of which the sutures were placed.     2. Please arrange for settlement of this account at the emergency registration.    3. All self-pay accounts are due in full at the time of treatment.  If you are unable to meet this obligation then payment is expected within 4-5 days.     4. If you have had radiology studies (CT, X-ray, Ultrasound, MRI), you have received a preliminary result during your emergency department visit. Please contact the radiology department [phone] to receive a copy of your final result. Please discuss the Final result with your primary physician or with the follow up physician provided.     Crisis Hotline:  [place] Crisis Hotline:  9-953-YEBZBWC or 1-441.585.8060  [place] Crisis Hotline:    1-903.992.4568 or 624-059-6152         ED Discharge Follow Up Questions    1. In order to provide you with very good care, we would like to follow up with a phone call in the next few days.  May we have your permission to contact you?     YES /  NO    2. What is the best phone number to call you? (       )_____-__________    3. What is the best time to call you?      Morning  /   Afternoon  /  Evening                   Patient Signature:  ____________________________________________________________    Date:  ____________________________________________________________      Your appointments     Mar 16, 2017 10:40 AM   Established Patient Pul with MEET eHrnandez   [facility] Pulmonary [facility] (--)    [address]  [address]  [place] [address]   [phone]            Mar 29, 2017 11:00 AM   Established Patient with Abbei Zimmerman M.D.   [facility]    [address]  [place] [address]   [phone]           You will be receiving a confirmation call a few days before your appointment from our automated call confirmation system.

## 2017-03-14 NOTE — TELEPHONE ENCOUNTER
1. Caller Name:     Abbie                 Call Back Number: [phone]    2. Message: per RN patients BP is running low. Systolic is running as low as 90 and is having some dizziness. Rn would like to know if you would like to change metoprolol or re-evaluate patient, please advise, thank you.    3. Patient approves office to leave a detailed voicemail/MyChart message: yes

## 2017-03-14 NOTE — ED AVS SNAPSHOT
Unbabel Access Code: Activation code not generated  Current Unbabel Status: Patient Declined    Your email address is not on file at [facility].  Email Addresses are required for you to sign up for Unbabel, please contact [phone] to verify your personal information and to provide your email address prior to attempting to register for Unbabel.    Michela Humphries  [address] [place] [address]    Causatat  A secure, online tool to manage your health information     [facility]’s Unbabel® is a secure, online tool that connects you to your personalized health information from the privacy of your home -- day or night - making it very easy for you to manage your healthcare. Once the activation process is completed, you can even access your medical information using the Unbabel karla, which is available for free in the Apple Karla store or Google Play store.     To learn more about Unbabel, visit [email or web address]    There are two levels of access available (as shown below):   My Chart Features  [facility] Primary Care Doctor [facility]  Specialists [facility]  Urgent  Care Non-[facility] Primary Care Doctor   Email your healthcare team securely and privately 24/7 X X X    Manage appointments: schedule your next appointment; view details of past/upcoming appointments X      Request prescription refills. X      View recent personal medical records, including lab and immunizations X X X X   View health record, including health history, allergies, medications X X X X   Read reports about your outpatient visits, procedures, consult and ER notes X X X X   See your discharge summary, which is a recap of your hospital and/or ER visit that includes your diagnosis, lab results, and care plan X X  X     How to register for Causatat:  Once your e-mail address has been verified, follow the following steps to sign up for Causatat.     1. Go to  [email or web address]  2. Click on the Sign Up Now box, which takes you to  the New Member Sign Up page. You will need to provide the following information:  a. Enter your EveryScape Access Code exactly as it appears at the top of this page. (You will not need to use this code after you’ve completed the sign-up process. If you do not sign up before the expiration date, you must request a new code.)   b. Enter your date of birth.   c. Enter your home email address.   d. Click Submit, and follow the next screen’s instructions.  3. Create a EveryScape ID. This will be your EveryScape login ID and cannot be changed, so think of one that is secure and easy to remember.  4. Create a EveryScape password. You can change your password at any time.  5. Enter your Password Reset Question and Answer. This can be used at a later time if you forget your password.   6. Enter your e-mail address. This allows you to receive e-mail notifications when new information is available in EveryScape.  7. Click Sign Up. You can now view your health information.    For assistance activating your EveryScape account, call [phone]

## 2017-03-14 NOTE — ED AVS SNAPSHOT
3/14/2017          Michela Humphries  [address]   [place] [address]    Dear Michela:    [facility] wants to ensure your discharge home is safe and you or your loved ones have had all your questions answered regarding your care after you leave the hospital.    You may receive a telephone call within two days of your discharge.  This call is to make certain you understand your discharge instructions as well as ensure we provided you with the best care possible during your stay with us.     The call will only last approximately 3-5 minutes and will be done by a nurse.    Once again, we want to ensure your discharge home is safe and that you have a clear understanding of any next steps in your care.  If you have any questions or concerns, please do not hesitate to contact us, we are here for you.  Thank you for choosing [facility] for your healthcare needs.    Sincerely,    Christian Moore    [facility]

## 2017-03-29 PROBLEM — M77.50 BONE SPUR OF FOOT: Status: ACTIVE | Noted: 2017-01-01

## 2017-03-29 NOTE — MR AVS SNAPSHOT
"        Michela Mustafa Edmond   3/29/2017 11:00 AM   Office Visit   MRN: [id number]    Department:  [facility]   Dept Phone:  [phone]    Description:  Female : 1945   Provider:  Abbie Zimmerman M.D.           Reason for Visit     Hypertension     Muscle Pain     Ankle Pain           Allergies as of 3/29/2017     Allergen Noted Reactions    Angiotensin Receptor Blockers 2012       Codeine 2011       Crestor [Rosuvastatin Calcium] 2012   Myalgia    Lipitor [Atorvastatin Calcium] 2012   Myalgia    Niacin 2012   Itching    Pradaxa 2014       Severe bleeding tendency    Red Yeast Rice [Misc Terri Hmg Coa Reduct Inhib] 2014   Myalgia    Statins [Hmg-Coa-R Inhibitors] 2011       Hydrocodone 2016   Unspecified    Headache. No reaction to Tylenol.      You were diagnosed with     Bone spur of foot   [2495732]       Chronic pain of both ankles   [4022801]       Chronic kidney disease (CKD), stage III (moderate)   [100058]         Vital Signs     Blood Pressure Pulse Temperature Respirations Height Weight    130/80 mmHg 62 36.2 °C (97.2 °F) 16 1.702 m (5' 7\") 176.903 kg (390 lb)    Body Mass Index Oxygen Saturation Smoking Status             61.07 kg/m2 92% Former Smoker         Basic Information     Date Of Birth Sex Race Ethnicity Preferred Language    1945 Female Black or  Non- English      Problem List              ICD-10-CM Priority Class Noted - Resolved    Morbid obesity with BMI of 50.0-59.9, adult (CMS-HCC) E66.01, Z68.43 Low  Unknown - Present    Obstructive sleep apnea G47.33 Low  Unknown - Present    Chronic atrial fibrillation (HCC) (Chronic) I48.2 High  3/21/2012 - Present    Hyperlipidemia (Chronic) E78.5 Low  2012 - Present    Edema extremities R60.0 Low  2012 - Present    Chronic kidney disease (CKD), stage III (moderate) (Chronic) N18.3 Medium  2012 - Present    Vitamin D deficiency disease E55.9   " 6/27/2012 - Present    Hypertension (Chronic) I10 Low  1/6/2014 - Present    COPD with asthma (HCC) (Chronic) J44.9, J45.909 Medium  Unknown - Present    Pulmonary HTN (CMS-HCC) (Chronic) I27.2 Medium  7/4/2016 - Present    Combined systolic and diastolic congestive heart failure, NYHA class 3 (CMS-HCC) I50.40 High  11/26/2016 - Present    Chronic obstructive pulmonary disease (CMS-HCC) J44.9   3/3/2017 - Present    Oxygen dependent Z99.81   3/3/2017 - Present    Chronic congestive heart failure (CMS-HCC) I50.9   3/3/2017 - Present    Bone spur of foot M77.50   3/29/2017 - Present      Health Maintenance        Date Due Completion Dates    IMM DTaP/Tdap/Td Vaccine (1 - Tdap) 4/1/2018 (Originally 8/23/1964) ---    IMM ZOSTER VACCINE 4/1/2018 (Originally 8/23/2005) ---    BONE DENSITY 1/6/2022 (Originally 10/19/2014) 10/19/2009    COLON CANCER SCREENING ANNUAL FIT 3/23/2018 3/23/2017, 2/24/2016, 7/17/2014    COLONOSCOPY 4/11/2019 4/11/2016, 3/30/2016            Current Immunizations     13-VALENT PCV PREVNAR 10/1/2015    Influenza TIV (IM) 10/1/2015, 10/1/2013, 9/1/2012    Influenza Vaccine 10/18/2011    Influenza Vaccine Adult HD 10/11/2016    Pneumococcal Vaccine (UF)Historical Data 1/18/2011    Pneumococcal polysaccharide vaccine (PPSV-23) 2/28/2017      Below and/or attached are the medications your provider expects you to take. Review all of your home medications and newly ordered medications with your provider and/or pharmacist. Follow medication instructions as directed by your provider and/or pharmacist. Please keep your medication list with you and share with your provider. Update the information when medications are discontinued, doses are changed, or new medications (including over-the-counter products) are added; and carry medication information at all times in the event of emergency situations     Allergies:  ANGIOTENSIN RECEPTOR BLOCKERS - (reactions not documented)     CODEINE - (reactions not  documented)     CRESTOR - Myalgia     LIPITOR - Myalgia     NIACIN - Itching     PRADAXA - (reactions not documented)     RED YEAST RICE - Myalgia     STATINS - (reactions not documented)     HYDROCODONE - Unspecified               Medications  Valid as of: March 29, 2017 - 12:01 PM    Generic Name Brand Name Tablet Size Instructions for use    Albuterol Sulfate (Aero Soln) PROAIR  (90 BASE) MCG/ACT INHALE 2 PUFFS BY MOUTH EVERY 4 HOURS AS NEEDED FOR SHORTNESS OF BREATH        Cholecalciferol (Tab) cholecalciferol 1000 UNIT Take 2,000 Units by mouth every day.        DiltiaZEM HCl Coated Beads (CAPSULE SR 24 HR) CARDIZEM  MG Take 1 Cap by mouth every day.        Furosemide (Tab) LASIX 80 MG Take 80 mg by mouth every day.        Levalbuterol HCl (Nebu Soln) XOPENEX 1.25 MG/3ML INHALE 3 ML BY MOUTH VIA NEBULIZER EVERY 4 HOURS AS NEEDED FOR SHORTNESS OF BREATH        Metoprolol Succinate (TABLET SR 24 HR) TOPROL XL 50 MG Take 1 Tab by mouth every day.        Misc. Devices (Misc) Misc. 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 Use spacer. Rinse mouth after use.        Omega-3 Fatty Acids (Cap) fish oil 1000 MG Take 1,000 mg by mouth every day.        Potassium Chloride Nancy CR (Tab CR) Kdur 20 MEQ Take 1 tablet by mouth  every day        Tiotropium Bromide Monohydrate (Aero Soln) Tiotropium Bromide Monohydrate 2.5 MCG/ACT Inhale 2 Inhalation by mouth every day. Assemble and prime.        TraMADol HCl (Tab) ULTRAM 50 MG Take 1 Tab by mouth every 6 hours as needed for Moderate Pain.        .                 Medicines prescribed today were sent to:     [facility] - [place] - [address] [place] [address] [address]  [facility] [address]    Phone: [phone] Fax: [phone]    Open 24 Hours?: No    [id number] - [place], [place] - [address]    [address] [place] [address]    Phone: [phone] Fax: [phone]    Open 24 Hours?: No    [facility]    [address] [facility] [address]    Phone: [phone] Fax: [phone]      Medication refill instructions:       If your prescription bottle indicates you have medication refills left, it is not necessary to call your provider’s office. Please contact your pharmacy and they will refill your medication.    If your prescription bottle indicates you do not have any refills left, you may request refills at any time through one of the following ways: The online [employer]zilla system (except Urgent Care), by calling your provider’s office, or by asking your pharmacy to contact your provider’s office with a refill request. Medication refills are processed only during regular business hours and may not be available until the next business day. Your provider may request additional information or to have a follow-up visit with you prior to refilling your medication.   *Please Note: Medication refills are assigned a new Rx number when refilled electronically. Your pharmacy may indicate that no refills were authorized even though a new prescription for the same medication is available at the pharmacy. Please request the medicine by name with the pharmacy before contacting your provider for a refill.           MyChart Status: Patient Declined

## 2017-03-29 NOTE — PROGRESS NOTES
CC: Pain in feet and ankles    HPI:   Michela presents today with the following.    1. Bone spur of foot  She is getting significant joint pain. This is particularly problematic in her feet and limits her walking. She was given morphine in the past by hospice but does not want to take this as that makes her extremely groggy and unsteady. She would like a later pain medication. She has problems of nausea with codeine and hydrocodone. She would like something relatively mild. Discussed tramadol. She is not sure if she took this or not in the past. She would like to try it.    2. Chronic pain of both ankles  She does have ankle pain bilaterally and also knee and hip pain. She has no definitive arthritis diagnosis other than the bony spurs in the feet.    3. Chronic kidney disease (CKD), stage III (moderate)  This has now stabilized on the lower dose of furosemide. She continues to follow with cardiology and pulmonology.      Patient Active Problem List    Diagnosis Date Noted   • Combined systolic and diastolic congestive heart failure, NYHA class 3 (CMS-HCC) 11/26/2016     Priority: High   • Chronic atrial fibrillation (HCC) 03/21/2012     Priority: High   • Pulmonary HTN (CMS-HCC) 07/04/2016     Priority: Medium   • COPD with asthma (HCC)      Priority: Medium   • Chronic kidney disease (CKD), stage III (moderate) 06/27/2012     Priority: Medium   • Hypertension 01/06/2014     Priority: Low   • Hyperlipidemia 04/19/2012     Priority: Low   • Edema extremities 04/19/2012     Priority: Low   • Morbid obesity with BMI of 50.0-59.9, adult (CMS-HCC)      Priority: Low   • Obstructive sleep apnea      Priority: Low   • Bone spur of foot 03/29/2017   • Chronic obstructive pulmonary disease (CMS-HCC) 03/03/2017   • Oxygen dependent 03/03/2017   • Chronic congestive heart failure (CMS-HCC) 03/03/2017   • Vitamin D deficiency disease 06/27/2012       Current Outpatient Prescriptions   Medication Sig Dispense Refill   • tramadol  (ULTRAM) 50 MG Tab Take 1 Tab by mouth every 6 hours as needed for Moderate Pain. 120 Tab 2   • levalbuterol (XOPENEX) 1.25 MG/3ML Nebu Soln INHALE 3 ML BY MOUTH VIA NEBULIZER EVERY 4 HOURS AS NEEDED FOR SHORTNESS OF BREATH 1620 mL 3   • metoprolol SR (TOPROL XL) 50 MG TABLET SR 24 HR Take 1 Tab by mouth every day. 90 Tab 3   • Misc. Devices Misc This is an order for a bariatric wheelchair  Class 3 combined diastolic and systolic heart failure,  Morbid obesity, BMI 56.3            VIPIN 99 months   NPI [id number] 1 Each 0   • furosemide (LASIX) 80 MG Tab Take 80 mg by mouth every day.     • Mometasone Furo-Formoterol Fum (DULERA) 200-5 MCG/ACT Aerosol Inhale 2 Puffs by mouth 2 Times a Day. Use spacer. Rinse mouth after use. 3 Inhaler 3   • Tiotropium Bromide Monohydrate (SPIRIVA RESPIMAT) 2.5 MCG/ACT Aero Soln Inhale 2 Inhalation by mouth every day. Assemble and prime. 3 Inhaler 3   • diltiazem CD (CARTIA XT) 180 MG CAPSULE SR 24 HR Take 1 Cap by mouth every day. 90 Cap 3   • potassium chloride SA (K-DUR) 20 MEQ Tab CR Take 1 tablet by mouth  every day 90 Tab 0   • PROAIR  (90 BASE) MCG/ACT Aero Soln inhalation aerosol INHALE 2 PUFFS BY MOUTH EVERY 4 HOURS AS NEEDED FOR SHORTNESS OF BREATH 1 Inhaler 5   • Omega-3 Fatty Acids (FISH OIL) 1000 MG Cap capsule Take 1,000 mg by mouth every day.     • vitamin D (CHOLECALCIFEROL) 1000 UNIT TABS Take 2,000 Units by mouth every day.       No current facility-administered medications for this visit.         Allergies as of 03/29/2017 - Jose Antonio as Reviewed 03/29/2017   Allergen Reaction Noted   • Angiotensin receptor blockers  03/19/2012   • Codeine  12/05/2011   • Crestor [rosuvastatin calcium] Myalgia 03/19/2012   • Lipitor [atorvastatin calcium] Myalgia 03/19/2012   • Niacin Itching 03/19/2012   • Pradaxa  07/16/2014   • Red yeast rice [misc ge hmg coa reduct inhib] Myalgia 07/08/2014   • Statins [hmg-coa-r inhibitors]  12/02/2011   • Hydrocodone Unspecified 07/05/2016  "       ROS: As per HPI.    /80 mmHg  Pulse 62  Temp(Src) 36.2 °C (97.2 °F)  Resp 16  Ht 1.702 m (5' 7\")  Wt 176.903 kg (390 lb)  BMI 61.07 kg/m2  SpO2 92% on 6 liters.    Physical Exam:  Gen:         Alert and oriented, No apparent distress.  Lucid and fluent.  In her wheelchair.  Neck:       ROM is full. No JVD appreciated. No Lymphadenopathy or Bruits.  Lungs:     Clear to auscultation bilaterally  CV:          Irregularly irregular rate and rhythm. No murmurs, rubs or gallops.               Ext:          No clubbing, cyanosis, 1+ pitting peripheral edema.  The pitting is greatly improved.      Assessment and Plan.   71 y.o. female with the following issues.    1. Bone spur of foot  She is having chronic pain. She does not want to use morphine which she was given in the past. She would like something much lighter. Tramadol has been discussed.  - tramadol (ULTRAM) 50 MG Tab; Take 1 Tab by mouth every 6 hours as needed for Moderate Pain.  Dispense: 120 Tab; Refill: 2    2. Chronic pain of both ankles  Medication is discussed. [facility] Board of pharmacy interface is reviewed and there are no narcotic prescriptions for over a year. The morphine was through hospice.  - tramadol (ULTRAM) 50 MG Tab; Take 1 Tab by mouth every 6 hours as needed for Moderate Pain.  Dispense: 120 Tab; Refill: 2    3. Chronic kidney disease (CKD), stage III (moderate)  Currently stable.    4. Morbid obesity with BMI of 60.0-69.9, adult (CMS-HCC)  Has been discussed several times in the past, discussed eating habits again. Patient states she is being very careful. Discussed with her that she has had a 36 pound weight gain in the last few weeks. She states that is not correct and that the previous measurement must be an error.  - Patient identified as having weight management issue.  Appropriate orders and counseling given.        "

## 2017-03-31 NOTE — TELEPHONE ENCOUNTER
Phone Number Called: [phone] (home)       Message: Left Pt message to call back.    Left Message for patient to call back: yes

## 2017-03-31 NOTE — TELEPHONE ENCOUNTER
Please stop right away, as I'm sure she already has.  I know she does not tolerate codeine or hydrocodone. There she now of any other pain medication that she can take?

## 2017-03-31 NOTE — TELEPHONE ENCOUNTER
I will have her try cymbalta 30 mg. This will not affect heart or kidney function.  I have sent that prescription to [facility]

## 2017-03-31 NOTE — TELEPHONE ENCOUNTER
VOICEMAIL  1. Caller Name: Michela Humphries                      Call Back Number: [phone] (home)     2. Message: pt called stating the tramadol (ULTRAM) 50 MG Tab is causing SOB please advise.     3. Patient approves office to leave a detailed voicemail/MyChart message: yes

## 2017-04-19 NOTE — TELEPHONE ENCOUNTER
MEDICATION PRIOR AUTHORIZATION NEEDED:    1. Name of Medication: Dulera 200/5 mcg    2. Requested By (Name of Pharmacy): [facility]     3. Is insurance on file current? yes    4. What is the name & phone number of the 3rd party payor? [facility] [phone]

## 2017-04-20 NOTE — TELEPHONE ENCOUNTER
DOCUMENTATION OF PRIOR AUTH STATUS    1. Medication name and dose: Dulera 200/5 mcg    2. Name and Phone # of Prescription coverage company: [employer] [phone]    3. Date Prior Auth was submitted: 4/19/2017    4. What information was given to obtain insurance decision: Clinical notes    5. Prior Auth letter Approved or Denied: Approved through 12/31/2017    6. Pharmacy notified: Yes    7. Patient notified: {YES (DEF)/NO:[id number] Yes

## 2017-06-01 NOTE — PROGRESS NOTES
CC: Hospital follow-up    HPI:   Michela presents today with the following.    1. COPD with asthma ([facility])  She was in [place] with an episode of congestive heart failure. She will be following with her cardiologist early next week. Her furosemide was increased to 80 mg again. She was taken off the potassium. She has noted episodic severe cough the last week, keeps her up at night.  Wishes to avoid narcotics due to her severe lung problems.  Was told there might be a capsule I could prescribe.  Discussed benzonatate pros and cons. She continues to take the guaifenesin. Denies any visible blood in the phlegm or deeply discolored phlegm, fever or chills or foul phlegm. She is very impressed with the ipratropium and albuterol combination. She is tolerating that very well and feels it clears her lungs much better than anything else she has used. She would like to continue that.  She continues to have her daily productive cough.    2. Hyperkalemia  She had been taken off the potassium due to hyperkalemia. She is still taking the high-dose furosemide daily but is not taking the potassium as instructed. She is due for a recheck of her potassium level. She is getting occasional muscle cramps, about the same as usual.    3. Chronic atrial fibrillation ([facility])  In the hospital she was changed to 120 on her diltiazem as she was having low pulse. She would like a renewal as she just took her last pill this morning. Denies edema or cough from this medication.  I believe she is not on a blood thinner due to a history of GI bleeding.    4. Essential hypertension  She feels her blood pressure has been in very good control. It is checked by the visiting nurse and is consistently in the 120s to 130s over 80s per patient. Denies chest pain or chest pressure.  Denies edema from the diltiazem.      Patient Active Problem List    Diagnosis Date Noted   • Combined systolic and diastolic congestive heart failure, NYHA class 3 (CMS-[facility])  11/26/2016     Priority: High   • Chronic atrial fibrillation (HCC) 03/21/2012     Priority: High   • Pulmonary HTN (CMS-HCC) 07/04/2016     Priority: Medium   • COPD with asthma ([facility])      Priority: Medium   • Chronic kidney disease (CKD), stage III (moderate) 06/27/2012     Priority: Medium   • Hypertension 01/06/2014     Priority: Low   • Hyperlipidemia 04/19/2012     Priority: Low   • Edema extremities 04/19/2012     Priority: Low   • Morbid obesity with BMI of 60.0-69.9, adult (CMS-HCC)      Priority: Low   • Obstructive sleep apnea      Priority: Low   • Bone spur of foot 03/29/2017   • Chronic obstructive pulmonary disease (CMS-HCC) 03/03/2017   • Oxygen dependent 03/03/2017   • Chronic congestive heart failure (CMS-HCC) 03/03/2017   • Vitamin D deficiency disease 06/27/2012       Current Outpatient Prescriptions   Medication Sig Dispense Refill   • ipratropium-albuterol (DUONEB) 0.5-2.5 (3) MG/3ML nebulizer solution 3 mL by Nebulization route 4 times a day. 120 Vial 6   • diltiazem (CARDIZEM CD) 120 MG XR capsule Take 1 Cap by mouth every day. 30 Cap 11   • benzonatate (TESSALON) 100 MG Cap Take 1 Cap by mouth 3 times a day as needed for Cough. 60 Cap 0   • potassium chloride SA (KDUR) 20 MEQ Tab CR Take 1 tablet by mouth  every day 90 Tab 0   • duloxetine (CYMBALTA) 20 MG Cap DR Particles Take 1 Cap by mouth every day. 30 Cap 6   • metoprolol SR (TOPROL XL) 50 MG TABLET SR 24 HR Take 1 Tab by mouth every day. 90 Tab 3   • Misc. Devices Misc This is an order for a bariatric wheelchair  Class 3 combined diastolic and systolic heart failure,  Morbid obesity, BMI 56.3            VIPIN 99 months   NPI [id number] 1 Each 0   • furosemide (LASIX) 80 MG Tab Take 80 mg by mouth every day.     • Mometasone Furo-Formoterol Fum (DULERA) 200-5 MCG/ACT Aerosol Inhale 2 Puffs by mouth 2 Times a Day. Use spacer. Rinse mouth after use. 3 Inhaler 3   • PROAIR  (90 BASE) MCG/ACT Aero Soln inhalation aerosol INHALE 2 PUFFS  "BY MOUTH EVERY 4 HOURS AS NEEDED FOR SHORTNESS OF BREATH 1 Inhaler 5   • Omega-3 Fatty Acids (FISH OIL) 1000 MG Cap capsule Take 1,000 mg by mouth every day.     • vitamin D (CHOLECALCIFEROL) 1000 UNIT TABS Take 2,000 Units by mouth every day.       No current facility-administered medications for this visit.         Allergies as of 06/01/2017 - Jose Antonio as Reviewed 06/01/2017   Allergen Reaction Noted   • Angiotensin receptor blockers  03/19/2012   • Codeine  12/05/2011   • Crestor [rosuvastatin calcium] Myalgia 03/19/2012   • Lipitor [atorvastatin calcium] Myalgia 03/19/2012   • Niacin Itching 03/19/2012   • Pradaxa  07/16/2014   • Red yeast rice [misc ge hmg coa reduct inhib] Myalgia 07/08/2014   • Statins [hmg-coa-r inhibitors]  12/02/2011   • Tramadol Shortness of Breath 03/31/2017   • Hydrocodone Unspecified 07/05/2016        ROS: As per HPI.    /80 mmHg  Pulse 90  Temp(Src) 36.9 °C (98.4 °F)  Resp 16  Ht 1.702 m (5' 7\")  Wt 154.677 kg (341 lb)  BMI 53.40 kg/m2  SpO2 87% this was on 5 L nasal prongs while in a wheelchair after minimal exertion.    Physical Exam:  Gen:         Alert and oriented, No apparent distress. Lucid and fluent.  Wearing her oxygen, 5 L.  Neck:       Full range of motion, no JVD appreciated. No retractions appreciated. No Lymphadenopathy or Bruits.  Lungs:     Clear to auscultation bilaterally, fair air movement, no rales, rhonchi or wheezes appreciated.  CV:          Regular rate and rhythm. No murmurs, rubs or gallops.               Ext:          No clubbing, cyanosis, stable moderate peripheral edema.      Assessment and Plan.   71 y.o. female with the following issues.    1. COPD with asthma (HCC)  She has long-standing and complex COPD with asthma. Certainly her past smoking history is a factor though she quit many years ago. I believe her obesity and her heart status is a major factor in her COPD. She continues with chronic bronchitis with daily productive cough.  - " ipratropium-albuterol (DUONEB) 0.5-2.5 (3) MG/3ML nebulizer solution; 3 mL by Nebulization route 4 times a day.  Dispense: 120 Vial; Refill: 6  - benzonatate (TESSALON) 100 MG Cap; Take 1 Cap by mouth 3 times a day as needed for Cough.  Dispense: 60 Cap; Refill: 0    2. Hyperkalemia  Order discussed and placed  - BASIC METABOLIC PANEL; Future    3. Chronic atrial fibrillation (HCC)  Order is placed. She will review this with her cardiologist when she sees him next week. She will bring in all her medications.  - diltiazem (CARDIZEM CD) 120 MG XR capsule; Take 1 Cap by mouth every day.  Dispense: 30 Cap; Refill: 11    4. Essential hypertension  She will obtain her BMP will discuss with Dr. Villa next week.  - diltiazem (CARDIZEM CD) 120 MG XR capsule; Take 1 Cap by mouth every day.  Dispense: 30 Cap; Refill: 11

## 2017-06-01 NOTE — MR AVS SNAPSHOT
"Michela Humphries   2017 11:20 AM   Office Visit   MRN: [id number]    Department:  [facility]   Dept Phone:  [phone]    Description:  Female : 1945   Provider:  Abbie Zimmerman M.D.           Reason for Visit     Hospital Follow-up           Allergies as of 2017     Allergen Noted Reactions    Angiotensin Receptor Blockers 2012       Codeine 2011       Crestor [Rosuvastatin Calcium] 2012   Myalgia    Lipitor [Atorvastatin Calcium] 2012   Myalgia    Niacin 2012   Itching    Pradaxa 2014       Severe bleeding tendency    Red Yeast Rice [Misc Terri Hmg Coa Reduct Inhib] 2014   Myalgia    Statins [Hmg-Coa-R Inhibitors] 2011       Tramadol 2017   Shortness of Breath    Hydrocodone 2016   Unspecified    Headache. No reaction to Tylenol.      You were diagnosed with     COPD with asthma (CMS-HCC)   [474903]       Hyperkalemia   [381200]       Chronic atrial fibrillation (CMS-HCC)   [233487]       Essential hypertension   [4940794]         Vital Signs     Blood Pressure Pulse Temperature Respirations Height Weight    130/80 mmHg 90 36.9 °C (98.4 °F) 16 1.702 m (5' 7\") 154.677 kg (341 lb)    Body Mass Index Oxygen Saturation Smoking Status             53.40 kg/m2 87% Former Smoker         Basic Information     Date Of Birth Sex Race Ethnicity Preferred Language    1945 Female Black or  Non- English      Problem List              ICD-10-CM Priority Class Noted - Resolved    Morbid obesity with BMI of 60.0-69.9, adult (CMS-HCC) E66.01, Z68.44 Low  Unknown - Present    Obstructive sleep apnea G47.33 Low  Unknown - Present    Chronic atrial fibrillation (HCC) (Chronic) I48.2 High  3/21/2012 - Present    Hyperlipidemia (Chronic) E78.5 Low  2012 - Present    Edema extremities R60.0 Low  2012 - Present    Chronic kidney disease (CKD), stage III (moderate) (Chronic) N18.3 Medium  2012 - Present   " Vitamin D deficiency disease E55.9   6/27/2012 - Present    Hypertension (Chronic) I10 Low  1/6/2014 - Present    COPD with asthma (HCC) (Chronic) J44.9, J45.909 Medium  Unknown - Present    Pulmonary HTN (CMS-HCC) (Chronic) I27.2 Medium  7/4/2016 - Present    Combined systolic and diastolic congestive heart failure, NYHA class 3 (CMS-HCC) I50.40 High  11/26/2016 - Present    Chronic obstructive pulmonary disease (CMS-HCC) J44.9   3/3/2017 - Present    Oxygen dependent Z99.81   3/3/2017 - Present    Chronic congestive heart failure (CMS-HCC) I50.9   3/3/2017 - Present    Bone spur of foot M77.50   3/29/2017 - Present      Health Maintenance        Date Due Completion Dates    IMM DTaP/Tdap/Td Vaccine (1 - Tdap) 4/1/2018 (Originally 8/23/1964) ---    IMM ZOSTER VACCINE 4/1/2018 (Originally 8/23/2005) ---    BONE DENSITY 1/6/2022 (Originally 10/19/2014) 10/19/2009    COLON CANCER SCREENING ANNUAL FIT 3/23/2018 3/23/2017, 2/24/2016, 7/17/2014    COLONOSCOPY 4/11/2019 4/11/2016, 3/30/2016            Current Immunizations     13-VALENT PCV PREVNAR 10/1/2015    Influenza TIV (IM) 10/1/2015, 10/1/2013, 9/1/2012    Influenza Vaccine 10/18/2011    Influenza Vaccine Adult HD 10/11/2016    Pneumococcal Vaccine (UF)Historical Data 1/18/2011    Pneumococcal polysaccharide vaccine (PPSV-23) 2/28/2017      Below and/or attached are the medications your provider expects you to take. Review all of your home medications and newly ordered medications with your provider and/or pharmacist. Follow medication instructions as directed by your provider and/or pharmacist. Please keep your medication list with you and share with your provider. Update the information when medications are discontinued, doses are changed, or new medications (including over-the-counter products) are added; and carry medication information at all times in the event of emergency situations     Allergies:  ANGIOTENSIN RECEPTOR BLOCKERS - (reactions not documented)      CODEINE - (reactions not documented)     CRESTOR - Myalgia     LIPITOR - Myalgia     NIACIN - Itching     PRADAXA - (reactions not documented)     RED YEAST RICE - Myalgia     STATINS - (reactions not documented)     TRAMADOL - Shortness of Breath     HYDROCODONE - Unspecified               Medications  Valid as of: June 01, 2017 - 12:05 PM    Generic Name Brand Name Tablet Size Instructions for use    Albuterol Sulfate (Aero Soln) PROAIR  (90 BASE) MCG/ACT INHALE 2 PUFFS BY MOUTH EVERY 4 HOURS AS NEEDED FOR SHORTNESS OF BREATH        Benzonatate (Cap) TESSALON 100 MG Take 1 Cap by mouth 3 times a day as needed for Cough.        Cholecalciferol (Tab) cholecalciferol 1000 UNIT Take 2,000 Units by mouth every day.        DilTIAZem HCl (CAPSULE SR 24 HR) CARDIZEM  MG Take 1 Cap by mouth every day.        DULoxetine HCl (Cap DR Particles) CYMBALTA 20 MG Take 1 Cap by mouth every day.        Furosemide (Tab) LASIX 80 MG Take 80 mg by mouth every day.        Ipratropium-Albuterol (Solution) DUONEB 0.5-2.5 (3) MG/3ML 3 mL by Nebulization route 4 times a day.        Metoprolol Succinate (TABLET SR 24 HR) TOPROL XL 50 MG Take 1 Tab by mouth every day.        Misc. Devices (Misc) Misc. Devices  This is an order for a bariatric wheelchair  Class 3 combined diastolic and systolic heart failure,  Morbid obesity, BMI 56.3            VIPIN 99 months   NPI [id number]        Mometasone Furo-Formoterol Fum (Aerosol) Mometasone Furo-Formoterol Fum 200-5 MCG/ACT Inhale 2 Puffs by mouth 2 Times a Day. Use spacer. Rinse mouth after use.        Omega-3 Fatty Acids (Cap) fish oil 1000 MG Take 1,000 mg by mouth every day.        Potassium Chloride Nancy CR (Tab CR) Kdur 20 MEQ Take 1 tablet by mouth  every day        .                 Medicines prescribed today were sent to:     [facility] - [address] [facility] [address]    Phone: [phone] Fax:  [phone]    Open 24 Hours?: No    [id number] - [place], [place] - [address]    [address] [place] [address]    Phone: [phone] Fax: [phone]    Open 24 Hours?: No    [facility] [place]    [address] [place] [address]    Phone: [phone] Fax: [phone]      Medication refill instructions:       If your prescription bottle indicates you have medication refills left, it is not necessary to call your provider’s office. Please contact your pharmacy and they will refill your medication.    If your prescription bottle indicates you do not have any refills left, you may request refills at any time through one of the following ways: The online [employer] system (except Urgent Care), by calling your provider’s office, or by asking your pharmacy to contact your provider’s office with a refill request. Medication refills are processed only during regular business hours and may not be available until the next business day. Your provider may request additional information or to have a follow-up visit with you prior to refilling your medication.   *Please Note: Medication refills are assigned a new Rx number when refilled electronically. Your pharmacy may indicate that no refills were authorized even though a new prescription for the same medication is available at the pharmacy. Please request the medicine by name with the pharmacy before contacting your provider for a refill.        Your To Do List     Future Labs/Procedures Complete By Expires    BASIC METABOLIC PANEL  As directed 6/1/2018         [employer] Status: Patient Declined

## 2017-06-05 NOTE — TELEPHONE ENCOUNTER
----- Message from Abbie Zimmerman M.D. sent at 6/2/2017  1:20 PM PDT -----  Please let patient know that her potassium level is on the high side of normal. The kidneys are little stressed with the high furosemide but this is not a major concern at this point. Since the potassium is still on the high side she should not restart her potassium supplement. I think she is going to do fine without it for quite a while. I suggest rechecking labs in about 6 weeks.

## 2017-06-20 NOTE — TELEPHONE ENCOUNTER
1. Caller Name: Eileen hargrove [facility]                      Call Back Number: [phone]    2. Message: Eileen CANELA with [facility] is requesting an order for resumption of care for pt at home after been release from hospital. Please fax to [phone]. Thank you     3. Patient approves office to leave a detailed voicemail/MyChart message: yes

## 2017-07-13 NOTE — TELEPHONE ENCOUNTER
ESTABLISHED PATIENT PRE-VISIT PLANNING     Note: Patient will not be contacted if there is no indication to call.     1.  Reviewed notes from the last few office visits within the medical group: Yes    2.  If any orders were placed at last visit or intended to be done for this visit (i.e. 6 mos follow-up), do we have Results/Consult Notes?        •  Labs - Labs ordered, completed and results are in chart.       •  Imaging - Imaging was not ordered at last office visit.       •  Referrals - No referrals were ordered at last office visit.    3. Is this appointment scheduled as a Hospital Follow-Up? Yes, visit was at [facility] records have been requested.    4.  Immunizations were updated in Epic using [employer]?: Epic matches [employer]       •  [employer] Recommendations: Hep A, adult Zoster (Shingles) Influenza w/preserv. Tdap      5.  Patient is due for the following Health Maintenance Topics:   Health Maintenance Due   Topic Date Due   • Annual Wellness Visit  1945   • PFT SCREENING-FEV1 AND FEV/FVC RATIO / SPIROMETRY SHOULD BE PERFORMED ANNUALLY  03/01/2017           6.  Patient was NOT informed to arrive 15 min prior to their scheduled appointment and bring in their medication bottles.

## 2017-07-13 NOTE — Clinical Note
[facility]  Abbie Zimmerman M.D.  [address]  [place] [address]  Fax: [phone]   Authorization for Release/Disclosure of   Protected Health Information   Name: ELIEZER LAWSON : 1945 SSN: [id number]   Address: [address] [place] [address] [address]   [place] [address] Phone:    [phone] (home)    I authorize the entity listed below to release/disclose the PHI below to:   [facility]/Abbie Zimmerman M.D. and Abbie Zimmerman M.D.   Provider or Entity Name:  [facility]    ******* S T A T ******** S T A T ********* S T A T **************** S T A T **************   Address   City, State, [facility]   Phone:      Fax:   [phone]     Reason for request: continuity of care   Information to be released:  RECENT HOSPITAL ADMISSION D/C 17   [  ] LAST COLONOSCOPY,  including any PATH REPORT and follow-up  [  ] LAST FIT/COLOGUARD RESULT [  ] LAST DEXA  [  ] LAST MAMMOGRAM  [  ] LAST PAP  [  ] LAST LABS [  ] RETINA EXAM REPORT  [  ] IMMUNIZATION RECORDS  [XXX ] Release all info      [  ] Check here and initial the line next to each item to release ALL health information INCLUDING  _____ Care and treatment for drug and / or alcohol abuse  _____ HIV testing, infection status, or AIDS  _____ Genetic Testing    DATES OF SERVICE OR TIME PERIOD TO BE DISCLOSED: DATES 17 - 17  I understand and acknowledge that:  * This Authorization may be revoked at any time by you in writing, except if your health information has already been used or disclosed.  * Your health information that will be used or disclosed as a result of you signing this authorization could be re-disclosed by the recipient. If this occurs, your re-disclosed health information may no longer be protected by State or Federal laws.  * You may refuse to sign this Authorization. Your refusal will not affect your ability to obtain treatment.  * This Authorization becomes effective upon signing and will  on (date)  __________.      If no date is indicated, this Authorization will  one (1) year from the signature date.    Name: Michela Humphries    Signature:   Date:     2017       PLEASE FAX REQUESTED RECORDS BACK TO: [phone]

## 2017-07-14 NOTE — PROGRESS NOTES
CC: Chronic ankle pain, bone spur, atrial fibrillation, hypertension, CTD, hyperkalemia, COPD with asthma, combined systolic and diastolic congestive heart failure class III    HPI:   Michela presents today with the following.    1. Chronic pain of both ankles  This is long-standing chronic pain which is mostly osteoarthritic with some component of midfoot collapse likely at least partially due to her morbid obesity. Discussed her obesity. She continues to try to ambulate with a walker a little bit more in her house though she has to use a wheelchair as she is doing today to go anywhere. She states the tramadol helps relieve the pain from a 7 or so down to around 4. This allows her to complete her ADLs with pacing and to wash herself with use of a shower chair. Denies somnolence or confusion from the medication. Denies nausea from the medication.    2. Bone spur of foot  The foot bone spur does suggest an element of plantar fasciitis. She cannot reach her feet to stretch them. She does try to stretch them somewhat against furniture. Discussed again emphasizing weight loss. She feels she has lost considerable weight. She was unable to get up onto our scale today so her weight is as stated weight.    3. Chronic atrial fibrillation (HCC)  She has been working with cardiology. On the metoprolol her rate seems to be good. Patient denies significant palpitations, dizziness, syncope or presyncope.  She will be following with her cardiologist this coming week.    4. Essential hypertension  Patient reports she is compliant with blood pressure medications. The medications are diltiazem, metoprolol and of course the furosemide lowers pressure as well. These medications are being taken primarily to treat her atrial fibrillation and only secondarily to treat her blood pressure. Reports she monitors Blood pressures at doctor's appointments as she is being seen nearly monthly with results of 120s over 80s generally. Good control.  Patient denies edema, depression or increased shortness of breath from the medications. Denies symptoms of chest pain or pressure. She does have stable shortness of breath, orthopnea and lower extremity edema. Pertinent information as follows:  Exercise is unable to do so.  Alcohol consumption zero drinks per week.      5. Chronic kidney disease (CKD), stage III (moderate)  Her creatinine has indeed elevated with use of the diuretics. However, the diuretics are extremely helpful to her and I believe are a cornerstone of her regimen at this time. Her diltiazem was reduced slightly because her blood pressure was beginning to get very low. She seems to have done satisfactorily on this and denies any fainting or dizziness.    6. Hyperkalemia  This was somewhat high in the hospital when she was also on spironolactone. She is no longer on the spironolactone. Her kidney function is somewhat reduced but not severely. She continues high-dose furosemide daily. Discussed resuming the potassium. She is beginning to get some leg cramps and muscle aching that she had in the past when her potassium was low. She is anxious about staying off the potassium. Have discussed pros and cons and resuming but every other day only. Discussed checking lab work frequently to be sure she is not getting hyperkalemic.    7. COPD with asthma ([facility])  This has been quite stable. She continues of course continuous oxygen supplementation. This is likely to be lifelong. She states the DuoNeb inhaler and the Dulera have been extremely helpful. She also uses the albuterol for rescue when needed. She does not smoke, she quit smoking many years ago. She was exposed to secondhand smoke for a while but is not at this time.    8. Combined systolic and diastolic congestive heart failure, NYHA class 3 (CMS-[facility])  Her CHF seems relatively stable but she will be going to cardiology in a few days and they will further assess her. Clinically she seems to be doing  much better.      Patient Active Problem List    Diagnosis Date Noted   • Combined systolic and diastolic congestive heart failure, NYHA class 3 (CMS-HCC) 11/26/2016     Priority: High   • Chronic atrial fibrillation (HCC) 03/21/2012     Priority: High   • Pulmonary HTN (CMS-HCC) 07/04/2016     Priority: Medium   • COPD with asthma ([facility])      Priority: Medium   • Chronic kidney disease (CKD), stage III (moderate) 06/27/2012     Priority: Medium   • Hypertension 01/06/2014     Priority: Low   • Hyperlipidemia 04/19/2012     Priority: Low   • Edema extremities 04/19/2012     Priority: Low   • Morbid obesity with BMI of 60.0-69.9, adult (CMS-HCC)      Priority: Low   • Obstructive sleep apnea      Priority: Low   • Bone spur of foot 03/29/2017   • Chronic obstructive pulmonary disease (CMS-HCC) 03/03/2017   • Oxygen dependent 03/03/2017   • Chronic congestive heart failure (CMS-HCC) 03/03/2017   • Vitamin D deficiency disease 06/27/2012       Current Outpatient Prescriptions   Medication Sig Dispense Refill   • tramadol (ULTRAM) 50 MG Tab Take 1 Tab by mouth every 6 hours as needed for Moderate Pain. 120 Tab 3   • diltiazem (CARDIZEM CD) 120 MG XR capsule Take 1 Cap by mouth every day. 90 Cap 3   • benzonatate (TESSALON) 100 MG Cap Take 1 Cap by mouth 3 times a day as needed for Cough. 120 Cap 2   • potassium chloride SA (KDUR) 20 MEQ Tab CR Take 1 Tab by mouth every day. 90 Tab 0   • furosemide (LASIX) 80 MG Tab Take 1 Tab by mouth every day. 90 Tab 3   • ipratropium-albuterol (DUONEB) 0.5-2.5 (3) MG/3ML nebulizer solution 3 mL by Nebulization route 4 times a day. 120 Vial 6   • metoprolol SR (TOPROL XL) 50 MG TABLET SR 24 HR Take 1 Tab by mouth every day. 90 Tab 3   • Misc. Devices Misc This is an order for a bariatric wheelchair  Class 3 combined diastolic and systolic heart failure,  Morbid obesity, BMI 56.3            VIPIN 99 months   NPI [id number] 1 Each 0   • Mometasone Furo-Formoterol Fum (DULERA) 200-5 MCG/ACT  "Aerosol Inhale 2 Puffs by mouth 2 Times a Day. Use spacer. Rinse mouth after use. 3 Inhaler 3   • PROAIR  (90 BASE) MCG/ACT Aero Soln inhalation aerosol INHALE 2 PUFFS BY MOUTH EVERY 4 HOURS AS NEEDED FOR SHORTNESS OF BREATH 1 Inhaler 5   • Omega-3 Fatty Acids (FISH OIL) 1000 MG Cap capsule Take 1,000 mg by mouth every day.     • vitamin D (CHOLECALCIFEROL) 1000 UNIT TABS Take 2,000 Units by mouth every day.       No current facility-administered medications for this visit.         Allergies as of 07/14/2017 - Jose Antonio as Reviewed 07/14/2017   Allergen Reaction Noted   • Angiotensin receptor blockers  03/19/2012   • Codeine  12/05/2011   • Crestor [rosuvastatin calcium] Myalgia 03/19/2012   • Lipitor [atorvastatin calcium] Myalgia 03/19/2012   • Niacin Itching 03/19/2012   • Pradaxa  07/16/2014   • Red yeast rice [misc ge hmg coa reduct inhib] Myalgia 07/08/2014   • Statins [hmg-coa-r inhibitors]  12/02/2011   • Hydrocodone Unspecified 07/05/2016        ROS: As per HPI.    /80 mmHg  Pulse 80  Temp(Src) 36.2 °C (97.2 °F)  Resp 20  Ht 1.702 m (5' 7\")  Wt 148.78 kg (328 lb)  BMI 51.36 kg/m2  SpO2 88% on 5 liters RA at rest, turned up to 6 L    Physical Exam:  Gen:         Alert and oriented, No apparent distress. Lucid and fluent. Wearing her oxygen, slightly short of breath even at rest. She is in her wheelchair.   Neck:       Range of motion is fairly good with some limitation to extension and flexion. No JVD or carotid bruits appreciated. No cervical adenopathy or thyromegaly appreciated.  Lungs:     Clear to auscultation bilaterally, breath sounds are somewhat distant. No rales appreciated today. No rhonchi appreciated. No retractions appreciated.  CV: Irregularly irregular rate and rhythm. No murmurs, rubs or gallops.               Ext:          No clubbing, cyanosis, 1+ bilateral peripheral edema, improved.      Assessment and Plan.   71 y.o. female with the following issues.    1. Chronic pain " of both ankles  Discussed her chronic pain. She feels the tramadol is overall quite helpful. Discussed her weight as a factor in her ankle pain. She is aware. She is limited in her activity both by her weight and her heart disease. State Board of pharmacy interface is reviewed. No inconsistencies are observed. Her current morphine milligram equivalent level is well within CDC guidelines for prescribing by primary care.  She cannot use anti-inflammatories due to her congestive heart failure.  - tramadol (ULTRAM) 50 MG Tab; Take 1 Tab by mouth every 6 hours as needed for Moderate Pain.  Dispense: 120 Tab; Refill: 3    2. Bone spur of foot  The pain medication continues to be quite helpful. State Board of pharmacy interface shows no inconsistencies. The morphine milligram equivalent level is within guidelines. As stated above she cannot use anti-inflammatory medication primarily due to her heart problems.  - tramadol (ULTRAM) 50 MG Tab; Take 1 Tab by mouth every 6 hours as needed for Moderate Pain.  Dispense: 120 Tab; Refill: 3    3. Chronic atrial fibrillation (HCC)  She does continue to be in chronic atrial fibrillation. She is not on stroke prevention anticoagulants as every regimen we've tried makes her bleed quite severely either rectally or in the urine. She is aware of the risk. She found the bleeding very frightening.  - diltiazem (CARDIZEM CD) 120 MG XR capsule; Take 1 Cap by mouth every day.  Dispense: 90 Cap; Refill: 3  - CBC WITHOUT DIFFERENTIAL; Future    4. Essential hypertension  The regimen continues to be very helpful and well tolerated at these current doses. She will continue the metoprolol 50 mg extended release once daily. She will discuss that dose with her cardiologist and make sure that what he her rate seems to be well controlled on this dose.  - diltiazem (CARDIZEM CD) 120 MG XR capsule; Take 1 Cap by mouth every day.  Dispense: 90 Cap; Refill: 3  - BASIC METABOLIC PANEL; Future  - CBC WITHOUT  DIFFERENTIAL; Future  - potassium chloride SA (KDUR) 20 MEQ Tab CR; Take 1 Tab by mouth every day.  Dispense: 90 Tab; Refill: 0    5. Chronic kidney disease (CKD), stage III (moderate)  This remains moderate and relatively stable since the higher dose diuretics were begun. The diuretics have been clinically very helpful to her. Orders are discussed and placed. I would like her to get this done once she has been back on the potassium regimen about a week.  - BASIC METABOLIC PANEL; Future  - CBC WITHOUT DIFFERENTIAL; Future    6. Hyperkalemia  This last one was in the normal range. She continues to take the high-dose furosemide daily. I have asked her to resume but just every other day. She is given a lab order to complete one week after going back on the potassium at every other day.  - BASIC METABOLIC PANEL; Future    7. COPD with asthma (HCC)  These continue to be extremely helpful for her. She is taking usually 1-2 at bedtime and sometimes has to take them during the day. Denies any somnolence or appreciated side effects. This is generally a benign medication. It is renewed.  - benzonatate (TESSALON) 100 MG Cap; Take 1 Cap by mouth 3 times a day as needed for Cough.  Dispense: 120 Cap; Refill: 2    8. Combined systolic and diastolic congestive heart failure, NYHA class 3 (CMS-HCC)  Clinically she appears to be more stable. She will be seeing cardiology again in a few days.  - furosemide (LASIX) 80 MG Tab; Take 1 Tab by mouth every day.  Dispense: 90 Tab; Refill: 3

## 2017-07-14 NOTE — MR AVS SNAPSHOT
"Michela Humphries   2017 3:20 PM   Office Visit   MRN: [id number]    Department:  [facility]   Dept Phone:  [phone]    Description:  Female : 1945   Provider:  Abbie Zimmerman M.D.           Reason for Visit     Hypokalemia     Ankle Pain           Allergies as of 2017     Allergen Noted Reactions    Angiotensin Receptor Blockers 2012       Codeine 2011       Crestor [Rosuvastatin Calcium] 2012   Myalgia    Lipitor [Atorvastatin Calcium] 2012   Myalgia    Niacin 2012   Itching    Pradaxa 2014       Severe bleeding tendency    Red Yeast Rice [Misc Terri Hmg Coa Reduct Inhib] 2014   Myalgia    Statins [Hmg-Coa-R Inhibitors] 2011       Hydrocodone 2016   Unspecified    Headache. No reaction to Tylenol.      You were diagnosed with     Chronic pain of both ankles   [0278380]       Bone spur of foot   [5411870]       Chronic atrial fibrillation (CMS-HCC)   [416256]       Essential hypertension   [5293214]       Chronic kidney disease (CKD), stage III (moderate)   [830604]       Hyperkalemia   [984420]       COPD with asthma (CMS-HCC)   [651838]       Combined systolic and diastolic congestive heart failure, NYHA class 3 (CMS-HCC)   [354813]         Vital Signs     Blood Pressure Pulse Temperature Respirations Height Weight    126/80 mmHg 80 36.2 °C (97.2 °F) 20 1.702 m (5' 7\") 148.78 kg (328 lb)    Body Mass Index Oxygen Saturation Smoking Status             51.36 kg/m2 88% Former Smoker         Basic Information     Date Of Birth Sex Race Ethnicity Preferred Language    1945 Female Black or  Non- English      Problem List              ICD-10-CM Priority Class Noted - Resolved    Morbid obesity with BMI of 60.0-69.9, adult (CMS-HCC) E66.01, Z68.44 Low  Unknown - Present    Obstructive sleep apnea G47.33 Low  Unknown - Present    Chronic atrial fibrillation (HCC) (Chronic) I48.2 High  3/21/2012 - Present  "    Hyperlipidemia (Chronic) E78.5 Low  4/19/2012 - Present    Edema extremities R60.0 Low  4/19/2012 - Present    Chronic kidney disease (CKD), stage III (moderate) (Chronic) N18.3 Medium  6/27/2012 - Present    Vitamin D deficiency disease E55.9   6/27/2012 - Present    Hypertension (Chronic) I10 Low  1/6/2014 - Present    COPD with asthma (HCC) (Chronic) J44.9, J45.909 Medium  Unknown - Present    Pulmonary HTN (CMS-HCC) (Chronic) I27.2 Medium  7/4/2016 - Present    Combined systolic and diastolic congestive heart failure, NYHA class 3 (CMS-HCC) I50.40 High  11/26/2016 - Present    Chronic obstructive pulmonary disease (CMS-HCC) J44.9   3/3/2017 - Present    Oxygen dependent Z99.81   3/3/2017 - Present    Chronic congestive heart failure (CMS-HCC) I50.9   3/3/2017 - Present    Bone spur of foot M77.50   3/29/2017 - Present      Health Maintenance        Date Due Completion Dates    IMM DTaP/Tdap/Td Vaccine (1 - Tdap) 4/1/2018 (Originally 8/23/1964) ---    IMM ZOSTER VACCINE 4/1/2018 (Originally 8/23/2005) ---    BONE DENSITY 1/6/2022 (Originally 10/19/2014) 10/19/2009    IMM INFLUENZA (1) 9/1/2017 10/11/2016, 10/1/2015, 10/1/2013, 9/1/2012    COLON CANCER SCREENING ANNUAL FIT 3/23/2018 3/23/2017, 2/24/2016, 7/17/2014    COLONOSCOPY 4/11/2019 4/11/2016, 3/30/2016            Current Immunizations     13-VALENT PCV PREVNAR 10/1/2015    Influenza TIV (IM) 10/1/2015, 10/1/2013, 9/1/2012    Influenza Vaccine 10/18/2011    Influenza Vaccine Adult HD 10/11/2016    Pneumococcal Vaccine (UF)Historical Data 1/18/2011    Pneumococcal polysaccharide vaccine (PPSV-23) 2/28/2017      Below and/or attached are the medications your provider expects you to take. Review all of your home medications and newly ordered medications with your provider and/or pharmacist. Follow medication instructions as directed by your provider and/or pharmacist. Please keep your medication list with you and share with your provider. Update the  information when medications are discontinued, doses are changed, or new medications (including over-the-counter products) are added; and carry medication information at all times in the event of emergency situations     Allergies:  ANGIOTENSIN RECEPTOR BLOCKERS - (reactions not documented)     CODEINE - (reactions not documented)     CRESTOR - Myalgia     LIPITOR - Myalgia     NIACIN - Itching     PRADAXA - (reactions not documented)     RED YEAST RICE - Myalgia     STATINS - (reactions not documented)     HYDROCODONE - Unspecified               Medications  Valid as of: July 14, 2017 -  4:45 PM    Generic Name Brand Name Tablet Size Instructions for use    Albuterol Sulfate (Aero Soln) PROAIR  (90 BASE) MCG/ACT INHALE 2 PUFFS BY MOUTH EVERY 4 HOURS AS NEEDED FOR SHORTNESS OF BREATH        Benzonatate (Cap) TESSALON 100 MG Take 1 Cap by mouth 3 times a day as needed for Cough.        Cholecalciferol (Tab) cholecalciferol 1000 UNIT Take 2,000 Units by mouth every day.        DilTIAZem HCl (CAPSULE SR 24 HR) CARDIZEM  MG Take 1 Cap by mouth every day.        Furosemide (Tab) LASIX 80 MG Take 1 Tab by mouth every day.        Ipratropium-Albuterol (Solution) DUONEB 0.5-2.5 (3) MG/3ML 3 mL by Nebulization route 4 times a day.        Metoprolol Succinate (TABLET SR 24 HR) TOPROL XL 50 MG Take 1 Tab by mouth every day.        Misc. Devices (Misc) Misc. Devices  This is an order for a bariatric wheelchair  Class 3 combined diastolic and systolic heart failure,  Morbid obesity, BMI 56.3            VIPIN 99 months   NPI [id number]        Mometasone Furo-Formoterol Fum (Aerosol) Mometasone Furo-Formoterol Fum 200-5 MCG/ACT Inhale 2 Puffs by mouth 2 Times a Day. Use spacer. Rinse mouth after use.        Omega-3 Fatty Acids (Cap) fish oil 1000 MG Take 1,000 mg by mouth every day.        Potassium Chloride [place] CR (Tab CR) Kdur 20 MEQ Take 1 Tab by mouth every day.        TraMADol HCl (Tab) ULTRAM 50 MG Take 1 Tab by  mouth every 6 hours as needed for Moderate Pain.        .                 Medicines prescribed today were sent to:     [facility] - [place], [place] - [address] [place] [address]    [address] [address] [facility] [address]    Phone: [phone] Fax: [phone]    Open 24 Hours?: No    [facility] - [place], [place] - [address] AT [address] OF [facility] [address]    [address] [place] [address]    Phone: [phone] Fax: [phone]    Open 24 Hours?: No    [facility] [place]    [address] [place] [address]    Phone: [phone] Fax: [phone]      Medication refill instructions:       If your prescription bottle indicates you have medication refills left, it is not necessary to call your provider’s office. Please contact your pharmacy and they will refill your medication.    If your prescription bottle indicates you do not have any refills left, you may request refills at any time through one of the following ways: The online [employer] system (except Urgent Care), by calling your provider’s office, or by asking your pharmacy to contact your provider’s office with a refill request. Medication refills are processed only during regular business hours and may not be available until the next business day. Your provider may request additional information or to have a follow-up visit with you prior to refilling your medication.   *Please Note: Medication refills are assigned a new Rx number when refilled electronically. Your pharmacy may indicate that no refills were authorized even though a new prescription for the same medication is available at the pharmacy. Please request the medicine by name with the pharmacy before contacting your provider for a refill.        Your To Do List     Future Labs/Procedures Complete By Expires    BASIC METABOLIC PANEL  As directed 7/15/2018    CBC WITHOUT DIFFERENTIAL  As directed 1/14/2018         [employer] Status: Patient Declined

## 2017-07-31 NOTE — PROGRESS NOTES
Attempt #:1    WebIZ Checked & Epic Updated: yes  HealthConnect Verified: no  Verify PCP: yes    Communication Preference Obtained: yes     Review Care Team: yes    Annual Wellness Visit Scheduling  1. Scheduling Status:Scheduled        Care Gap Scheduling (Attempt to Schedule EACH Overdue Care Gap!)     Health Maintenance Due   Topic Date Due   • Annual Wellness Visit  SCHED   • PFT SCREENING-FEV1 AND FEV/FVC RATIO / SPIROMETRY SHOULD BE PERFORMED ANNUALLY  03/01/2017         [employer] Activation: declined  [employer] Karla: no  Virtual Visits: no  Opt In to Text Messages: no

## 2017-12-25 PROBLEM — J44.1 COPD WITH ACUTE EXACERBATION (HCC): Status: ACTIVE | Noted: 2017-01-01

## 2017-12-25 PROBLEM — I50.30 DIASTOLIC CHF (HCC): Status: ACTIVE | Noted: 2017-01-01

## 2017-12-25 PROBLEM — J96.01 ACUTE HYPOXEMIC RESPIRATORY FAILURE (HCC): Status: ACTIVE | Noted: 2017-01-01

## 2017-12-26 NOTE — PROGRESS NOTES
Paged on-call hospitalist Rhoda regarding pt's low BP and orders for hydralazine and isorbide. Okay to hold hydralazine and isorbide per hospitalist.

## 2017-12-26 NOTE — PROGRESS NOTES
Paged hospitalist Rhoda regarding pt's low BP and order for hydralazine. Okay to hold hydralazine this morning d/t low BP per on-call hospitalist Rhoda.

## 2017-12-26 NOTE — ED NOTES
tech from Lab called with critical result of PCO2 at 60.8. Critical lab result read back to tech.   Dr. Castellanos notified of critical lab result at 1956.  Critical lab result read back by Dr. castellanos.

## 2017-12-26 NOTE — PROGRESS NOTES
2 RN skin assessment completed with Adalberto CANELA: Moisture fissure to midline sacrum. Edema to bilateral lower extremities with dryness, flakiness. Otherwise, no skin abnormalities noted.

## 2017-12-26 NOTE — ED NOTES
BIBA to 5 with report of   Chief Complaint   Patient presents with   • Shortness of Breath     onset today.  pt on home O2 at 16l per NC, per EMS report O2 SAT 80% on home O2.  speaking full clear sentences at this time.  reports thick green sputum and was started on oral antibiotics X 3 days without relief   denies fever.

## 2017-12-26 NOTE — PROGRESS NOTES
Assumed care of patient. Bedside report received from HILTON Beltrán. Updated on POC, call light within reach and fall precautions in place. Bed locked and in lowest position. Patient instructed to call for assistance before getting out of bed. All questions answered, no other needs at this time. MAR, labs, orders reviewed.

## 2017-12-26 NOTE — PROGRESS NOTES
Chelsea Ojeda Fall Risk Assessment:     Last Known Fall: No falls  Mobility: Use of assistive device/requires assist of two people  Medications: Cardiovascular or central nervous system meds, Diuretics  Mental Status/LOC/Awareness: Awake, alert, and oriented to date, place, and person  Toileting Needs: Use of assistive device (Bedside commode, bedpan, urinal), Incontinence  Volume/Electrolyte Status: No problems  Communication/Sensory: Visual (Glasses)/hearing deficit  Behavior: Appropriate behavior  Chelsea Ojeda Fall Risk Total: 16  Fall Risk Level: HIGH RISK    Universal Fall Precautions:  call light/belongings in reach, bed in low position and locked, wheelchairs and assistive devices out of sight, siderails up x 2, use non-slip footwear, adequate lighting, clutter free and spill free environment, educate on level of risk, educate to call for assistance    Fall Risk Level Interventions:    TRIAL (TELE 8, NEURO, MED MAAME 5) Moderate Fall Risk Interventions  Place yellow fall risk ID band on patient: verified  Provide patient/family education based on risk assessment : completed  Educate patient/family to call staff for assistance when getting out of bed: completed  Place fall precaution signage outside patient door: verified  Utilize bed/chair fall alarm: verifiedTRIAL (TELE 8, NEURO, MED MAAME 5) High Fall Risk Interventions  Place yellow fall risk ID band on patient: verified  Provide patient/family education based on risk assessment: completed  Educate patient/family to call staff for assistance when getting out of bed: completed  Place fall precaution signage outside patient door: verified  Place patient in room close to nursing station: completed  Utilize bed/chair fall alarm: completed  Notify charge of high risk for huddle: completed    Patient Specific Interventions:     Medication: review medications with patient and family, assess for medications that can be discontinued or dosage decreased, limit  combination of prn medications and provide patients who received diuretics/laxatives frequent toileting  Mental Status/LOC/Awareness: reinforce falls education, check on patient hourly, utilize bed/chair fall alarm and reinforce the use of call light  Toileting: provide frquent toileting, monitor intake and output/use of appropriate interventions, instruct patient/family on the need to call for assistance when toileting and do not leave patient unattended in bathroom/refer to toileting scripting  Volume/Electrolyte Status: monitor abnormal lab values  Communication/Sensory: update plan of care on whiteboard, ensure proper positioning when transferrng/ambulating and ensure patient has glasses/contacts and hearing aids/dentures  Behavioral: administer medication as ordered and instruct/reinforce fall program rationale  Mobility: utilize bed/chair fall alarm, ensure bed is locked and in lowest position and provide appropriate assistive device

## 2017-12-26 NOTE — CARE PLAN
Problem: Safety  Goal: Will remain free from injury  Outcome: PROGRESSING AS EXPECTED  Pt remains free from falls or injuries. Bed alarm set and pt calls for assistance appropriately. Pt 3-3 assist and calls for assistance appropriately.     Problem: Venous Thromboembolism (VTW)/Deep Vein Thrombosis (DVT) Prevention:  Goal: Patient will participate in Venous Thrombosis (VTE)/Deep Vein Thrombosis (DVT)Prevention Measures  Outcome: PROGRESSING AS EXPECTED  Pt free from s/sx of DVT. Pt receiving heparin for VTE prophylaxis.

## 2017-12-26 NOTE — ED PROVIDER NOTES
ED Provider Note    CHIEF COMPLAINT  Chief Complaint   Patient presents with   • Shortness of Breath     onset today.  pt on home O2 at 16l per [place], per EMS report O2 SAT 80% on home O2.  speaking full clear sentences at this time.  reports thick green sputum and was started on oral antibiotics X 3 days without relief       HPI  Michela Humphries is a 72 y.o. female With a history significant for a flutter, chronic bronchitis, CHF, morbid obesity, pulmonary hypertension, chronic kidney disease who presents with shortness of breath and productive cough with yellow sputum over the last 3 days, it is gotten worse over this 3 days gradually. Patient denies chest pain. Patient reports she was started on antibiotics 3 days ago by mouth. Her symptoms have only gotten worse. She also describes bilateral leg swelling.    REVIEW OF SYSTEMS  See HPI for further details. All other systems are negative.     PAST MEDICAL HISTORY   has a past medical history of Anesthesia; Aspirin long-term use; Atrial flutter (CMS-[facility]); Chronic bronchitis (CMS-[facility]); CKD (chronic kidney disease) stage 3, GFR 30-59 ml/min; Colon adenomas; Congestive heart failure (CMS-[facility]); COPD with asthma (CMS-[facility]); Diastolic heart failure; Echocardiogram abnormal; Essential hypertension, benign; Hypoxemia; Morbid obesity (CMS-[facility]); Primary pulmonary hypertension (CMS-[facility]); Snoring; Unspecified cataract; and Unspecified sleep apnea.    SOCIAL HISTORY  Social History     Social History Main Topics   • Smoking status: Former Smoker     Packs/day: 0.50     Years: 3.00     Types: Cigarettes     Quit date: 3/1/1972   • Smokeless tobacco: Never Used   • Alcohol use No   • Drug use: No   • Sexual activity: Not on file       SURGICAL HISTORY   has a past surgical history that includes recovery (1/16/2012); shoulder surgery; knee arthroscopy; colonoscopy (3/30/2016); and recovery (6/16/2016).    CURRENT MEDICATIONS  Home Medications     Reviewed by Marj Bonds R.N.  "(Registered Nurse) on 12/25/17 at 1740  Med List Status: Partial   Medication Last Dose Status   benzonatate (TESSALON) 100 MG Cap  Active   diltiazem (CARDIZEM CD) 120 MG XR capsule  Active   furosemide (LASIX) 80 MG Tab  Active   ipratropium-albuterol (DUONEB) 0.5-2.5 (3) MG/3ML nebulizer solution  Active   metoprolol SR (TOPROL XL) 50 MG TABLET SR 24 HR  Active   Misc. Devices Misc 3/3/2017 Active   Mometasone Furo-Formoterol Fum (DULERA) 200-5 MCG/ACT Aerosol 3/3/2017 Active   Omega-3 Fatty Acids (FISH OIL) 1000 MG Cap capsule 11/25/2016 Active   potassium chloride SA (KDUR) 20 MEQ Tab CR  Active   PROAIR  (90 BASE) MCG/ACT Aero Soln inhalation aerosol 11/25/2016 Active   tramadol (ULTRAM) 50 MG Tab  Active   vitamin D (CHOLECALCIFEROL) 1000 UNIT TABS 11/25/2016 Active                ALLERGIES  Allergies   Allergen Reactions   • Angiotensin Receptor Blockers    • Codeine    • Crestor [Rosuvastatin Calcium] Myalgia   • Lipitor [Atorvastatin Calcium] Myalgia   • Niacin Itching   • Pradaxa      Severe bleeding tendency   • Red Yeast Rice [Misc Terri Hmg Coa Reduct Inhib] Myalgia   • Statins [Hmg-Coa-R Inhibitors]    • Hydrocodone Unspecified     Headache. No reaction to Tylenol.       PHYSICAL EXAM  VITAL SIGNS: Pulse (!) 52   Temp 37.2 °C (99 °F)   Resp 13   Ht 1.702 m (5' 7\")   Wt (!) 153 kg (337 lb 4.9 oz)   SpO2 100%   BMI 52.83 kg/m²  @ANNETTA[[id number]::@   Pulse ox interpretation: I interpret this pulse ox as a corrected hypoxia.  Constitutional: Alert, moderate respiratory distress.  HENT: No signs of trauma, Bilateral external ears normal, Nose normal.   Eyes: Pupils are equal and reactive, Conjunctiva normal, Non-icteric.   Neck: Normal range of motion, No tenderness, Supple, No stridor.   Lymphatic: No lymphadenopathy noted.   Cardiovascular: Regular rate and rhythm, no murmurs.   Thorax & Lungs: Physical exam is limited by body habitus, she has decreased breath sounds in both bases.  Abdomen: Bowel " sounds normal, Soft, No tenderness, No masses, No pulsatile masses. No peritoneal signs.  Skin: Warm, Dry, No erythema, No rash.   Extremities: Intact distal pulses, Bilateral lower extremity edema, No tenderness, No cyanosis.  Musculoskeletal: Good range of motion in all major joints. No tenderness to palpation or major deformities noted.   Neurologic: Alert , Normal motor function, Normal sensory function, No focal deficits noted.   Psychiatric: Affect normal, Judgment normal, Mood normal.     DIAGNOSTIC STUDIES / PROCEDURES    EKG  This is a 12-lead EKG interpretation by myself. It is atrial fibrillation at a rate of 94. The axis is normal. The intervals are normal. There are nonspecific ST-T wave changes. When compared with previous EKG from November 27, 2016 there are no significant changes. My impression of this EKG, unchanged from previous, does not indicate a STEMI criteria at this time.    LABS  Labs Reviewed   CBC WITH DIFFERENTIAL - Abnormal; Notable for the following:        Result Value    MCHC 31.3 (*)     RDW 61.4 (*)     Lymphocytes 19.90 (*)     Monocytes 17.20 (*)     Lymphs (Absolute) 0.96 (*)     All other components within normal limits    Narrative:     Indicate which anticoagulants the patient is on:->NONE   COMP METABOLIC PANEL - Abnormal; Notable for the following:     Co2 34 (*)     Glucose 100 (*)     Bun 40 (*)     Creatinine 1.52 (*)     Alkaline Phosphatase 152 (*)     Total Bilirubin 1.6 (*)     Globulin 4.1 (*)     All other components within normal limits    Narrative:     Indicate which anticoagulants the patient is on:->NONE   BTYPE NATRIURETIC PEPTIDE - Abnormal; Notable for the following:     B Natriuretic Peptide 1188 (*)     All other components within normal limits    Narrative:     Indicate which anticoagulants the patient is on:->NONE   PROTHROMBIN TIME - Abnormal; Notable for the following:     PT 16.1 (*)     INR 1.32 (*)     All other components within normal limits     "Narrative:     Indicate which anticoagulants the patient is on:->NONE   ESTIMATED GFR - Abnormal; Notable for the following:     GFR If  41 (*)     GFR If Non  34 (*)     All other components within normal limits    Narrative:     Indicate which anticoagulants the patient is on:->NONE   LACTIC ACID    Narrative:     Indicate which anticoagulants the patient is on:->NONE   BLOOD CULTURE    Narrative:     1 of 2 for Blood Culture x 2 sites order. Per Hospital  Policy: Only change Specimen Src: to \"Line\" if specified by  physician order.   BLOOD CULTURE    Narrative:     2 of 2 blood culture x2  Sites order. Per Hospital Policy:  Only change Specimen Src: to \"Line\" if specified by physician  order.   TROPONIN    Narrative:     Indicate which anticoagulants the patient is on:->NONE   APTT    Narrative:     Indicate which anticoagulants the patient is on:->NONE   INFLUENZA A/B BY PCR   ARTERIAL BLOOD GAS    Narrative:     rt brachial         RADIOLOGY  DX-CHEST-PORTABLE (1 VIEW)   Final Result      1.  Stable cardiomegaly      2.  Pulmonary vascular prominence with increased perihilar interstitial markings. Findings are suggestive of pulmonary edema. Atypical infection could have a similar appearance.              COURSE & MEDICAL DECISION MAKING  Pertinent Labs & Imaging studies reviewed. (See chart for details)    Differential diagnosis: CHF, pneumonia, bronchitis, COPD    The patient's labs and physical exam indicate CHF. Her BNP is 1180.    The patient is treated with 40 mg IV Lasix. The patient currently does not require intubation. She confirms that she is full code. At this time her presentation indicates CHF, not infection and therefore fluids and antibiotics are not indicated currently.     I spoke with Dr. Abhilash Castellanos who will admit the patient for the hospitalist service. The patient is admitted in poor condition.    The total critical care time on this patient is 40 minutes, " resuscitating patient, speaking with admitting physician, and deciphering test results. This 40 minutes is exclusive of separately billable procedures.            FINAL IMPRESSION  1. Acute Shortness of Breath  2. Hypoxia  3. CHF         Electronically signed by: Bereket Carvalho, 12/25/2017 5:54 PM

## 2017-12-26 NOTE — DIETARY
Nutrition Services:      Unintentional weight loss PTA on Nutrition Admit Screen. Pt is currently on a Regular/ 2000 kcal diet and pt reports weight loss was intentional and her appetite is good. Pt with BMI >40 (=56.1). Weight loss counseling not appropriate in acute care setting. RECOMMEND - Referral to outpatient nutrition services for weight management after D/C.     Consult RD as needed. RD will re-screen weekly.      RD available prn

## 2017-12-26 NOTE — ED NOTES
Med rec updated and complete.  Allergies reviewed and updated.  Met with pt at bedside and dicussed current medications and last doses taken.  Pt denies antibiotic use in last 30 days.

## 2017-12-26 NOTE — PROGRESS NOTES
Chelsea Ojeda Fall Risk Assessment:     Last Known Fall: No falls  Mobility: Use of assistive device/requires assist of two people  Medications: Cardiovascular or central nervous system meds  Mental Status/LOC/Awareness: Awake, alert, and oriented to date, place, and person  Toileting Needs: Use of assistive device (Bedside commode, bedpan, urinal), Diarrhea/frequency/urgency  Volume/Electrolyte Status: No problems  Communication/Sensory: Visual (Glasses)/hearing deficit  Behavior: Appropriate behavior  Chelsea Ojeda Fall Risk Total: 14  Fall Risk Level: MODERATE RISK    Universal Fall Precautions:  call light/belongings in reach, bed in low position and locked, wheelchairs and assistive devices out of sight, siderails up x 2, use non-slip footwear, adequate lighting, clutter free and spill free environment, educate on level of risk, educate to call for assistance    Fall Risk Level Interventions:    TRIAL (TELE 8, NEURO, MED MAAME 5) Moderate Fall Risk Interventions  Place yellow fall risk ID band on patient: verified  Provide patient/family education based on risk assessment : completed  Educate patient/family to call staff for assistance when getting out of bed: completed  Place fall precaution signage outside patient door: verified  Utilize bed/chair fall alarm: verified     Patient Specific Interventions:     Medication: review medications with patient and family, assess for medications that can be discontinued or dosage decreased, limit combination of prn medications and provide patients who received diuretics/laxatives frequent toileting  Mental Status/LOC/Awareness: reinforce falls education, check on patient hourly, utilize bed/chair fall alarm, reinforce the use of call light and provide activity  Toileting: consider obtaining elevated toilet seat or bedside commode (BSC), provide frquent toileting, monitor intake and output/use of appropriate interventions, instruct patient/family on the use of grab bars,  instruct patient/family on the need to call for assistance when toileting and do not leave patient unattended in bathroom/refer to toileting scripting  Volume/Electrolyte Status: monitor abnormal lab values  Communication/Sensory: update plan of care on whiteboard, ensure proper positioning when transferrng/ambulating, ensure patient has glasses/contacts and hearing aids/dentures and for visually impaired patients orient to their room surrounding and do not change their surroundings  Behavioral: encourage patient to voice feelings, engage patient in daily activities, administer medication as ordered and instruct/reinforce fall program rationale  Mobility: provide comfort measures during transport, utilize bed/chair fall alarm, ensure bed is locked and in lowest position, provide appropriate assistive device and instruct patient to exit bed on their strongest side

## 2017-12-26 NOTE — DISCHARGE PLANNING
Care Transition Team Assessment    IHD met with pt at bedside. Pt currently lives alone at home, and states that she does not feel as though it would be a safe discharge if she were to return. Pt states that she primarily relies on her sister for support, but is unable to care for herself at all. She has a walker and wheelchair at home. She does not have HHC at this time, but does have home O2 provided by [employer].     Information Source  Orientation : Oriented x 4  Information Given By: Patient  Informant's Name: Michela   Who is responsible for making decisions for patient? : Patient         Elopement Risk  Legal Hold: No  Ambulatory or Self Mobile in Wheelchair: No-Not an Elopement Risk  Elopement Risk: Not at Risk for Elopement    Interdisciplinary Discharge Planning  Does Admitting Nurse Feel This Could be a Complex Discharge?: No  Primary Care Physician:  (Dr Abdalla, [facility])  Lives with - Patient's Self Care Capacity: Alone and Able to Care For Self  Patient or legal guardian wants to designate a caregiver (see row info): No  Support Systems: Other (Comments), Friends / Neighbors (Sister, Tricia Freeman(friend))  Housing / Facility: 1 Story Apartment / Condo  Name of Care Facility:  (None)  Do You Take your Prescribed Medications Regularly: Yes  Able to Return to Previous ADL's: Other (Unknown)  Mobility Issues: Yes  Prior Services: Skilled Home Health Services, Meals on Wheels, [employer] Alert Services  Patient Expects to be Discharged to::  (Skilled Nursing Facility per pt)  Assistance Needed: Unknown at this Time  Durable Medical Equipment: Home Oxygen, Walker, Unknown (Wheelchair)    Discharge Preparedness  What is your plan after discharge?: Uncertain - pending medical team collaboration  What are your discharge supports?: Sibling  Prior Functional Level: Ambulatory, Needs Assist with Activities of Daily Living, Needs Assist with Medication Management, Uses Wheelchair, Uses Walker  Difficulity with  ADLs: Bathing, Dressing, Eating, Toileting, Walking  Difficulty with ADLs Comment: assisted by family; uises walker and wheelchair  Difficulity with IADLs: Cooking, Driving, Keeping track of finances, Laundry, Managing medication, Shopping, Using the telephone or computer  Difficulity with IADL Comments: assisted by family    Functional Assesment  Prior Functional Level: Ambulatory, Needs Assist with Activities of Daily Living, Needs Assist with Medication Management, Uses Wheelchair, Uses Walker    Finances  Financial Barriers to Discharge: No  Prescription Coverage: Yes ([facility] [address])    Vision / Hearing Impairment  Vision Impairment : Yes  Right Eye Vision: Impaired, Wears Glasses  Left Eye Vision: Impaired, Wears Glasses  Hearing Impairment : No    Values / Beliefs / Concerns  Values / Beliefs Concerns : No  Special Hospitalization Concerns:  (None)         Domestic Abuse  Have you ever been the victim of abuse or violence?: No  Physical Abuse or Sexual Abuse: No  Verbal Abuse or Emotional Abuse: No  Possible Abuse Reported to:: Not Applicable    Psychological Assessment  History of Substance Abuse: None  History of Psychiatric Problems: No  Non-compliant with Treatment: No    Discharge Risks or Barriers  Discharge risks or barriers?: No  Patient risk factors: Lack of outside supports    Anticipated Discharge Information  Anticipated discharge disposition: Discharge needs currently unknown  Discharge Address: unknown at this time  Discharge Contact Phone Number: [phone]

## 2017-12-26 NOTE — CARE PLAN
Problem: Oxygenation:  Goal: Maintain adequate oxygenation dependent on patient condition  Outcome: PROGRESSING AS EXPECTED  Home O2 12-15L   NOC/BIPAP 15/11      Problem: Bronchoconstriction:  Goal: Improve in air movement and diminished wheezing  Outcome: PROGRESSING AS EXPECTED  Q4 Xopenex SVN

## 2017-12-26 NOTE — PROGRESS NOTES
Pt arrived to floor via gurney by myself and tele RN with elizabeth. Pt care assumed. Monitor room notified of pt arrival to floor and name verified on monitor- pt Afib with HR in 90s. Patient assessed. A&O x 4. No distress present; no pain. Call light, phone, and bedside table within reach. White board updated and plan of care discussed with patient. Bed alarm set and pt calls for assistance appropriately. Will continue to monitor.

## 2017-12-26 NOTE — H&P
"DATE OF ADMISSION:  12/25/2017    PRIMARY CARE PHYSICIAN:  Abbie Zimmerman MD    CHIEF COMPLAINT:  \"I could not breathe today.\"    HISTORY OF PRESENT ILLNESS:  The patient is a 72-year-old female with history   of COPD and morbid obesity who presents with complaints of difficulty   breathing today.  Patient had lots of cough with positive sputum and small   tiny streaks of blood after much coughing.  Patient also had complained of   increasing leg swelling.  Patient denied ill contacts.  Patient denied any   fevers or chills or nausea, vomiting, diarrhea.  Patient denied chest pain   associated with this episode.    PAST MEDICAL HISTORY:  1.  Recent antibiotic treatment.  2.  Morbid obesity with BMI greater than 56.  3.  COPD/asthma.  4.  Chronic kidney disease, stage III.  5.  Atrial flutter.  6.  Sleep apnea, on BiPAP.  7.  Diastolic congestive heart failure.    PAST SURGICAL HISTORY:  Cardiac catheterization, shoulder surgery of unknown   type, colonoscopy, knee scoping.    FAMILY HISTORY:  Significant for brain aneurysm, dementia, hypertension, jaw   cancer.    SOCIAL HISTORY:  No alcohol, tobacco or IV drug use.    ALLERGIES:  THE PATIENT HAS ALLERGIES TO ANGIOTENSIN RECEPTOR BLOCKER,   ATORVASTATIN, CODEINE, NIACIN, PRADAXA, RED YEAST RICE, ROSUVASTATIN, STATINS,   HYDROCODONE.    MEDICATIONS:  Benzonatate 1 capsule t.i.d. p.r.n. for cough, diltiazem    mg every 24 hours, torsemide 80 mg daily, DuoNeb nebulizer q.i.d., metoprolol   SR 50 mg daily, Dulera 2 puffs t.i.d., fish oil 1000 mg daily, potassium   chloride 20 mEq daily, ProAir 2 puffs q. 4 hours p.r.n. for dyspnea, Spiriva   Respimat 1 puff daily, tramadol 50 mg q. 6 hours p.r.n. for moderate pain,   vitamin D 2000 units daily.    REVIEW OF SYSTEMS:  Patient denied recent ill encounters.  Positive for cough   and sputum production.  Denied fevers, chills, nausea, vomiting, diarrhea.  No   hemoptysis or hematemesis.  No GI or  dysfunction or GI " or  bleed.  No   focal neurologic complaints.  No rashes or ulcerations.    PHYSICAL EXAMINATION:  GENERAL:  Morbidly obese female in mild distress.  VITAL SIGNS:  Temperature 37.2 C, heart rate 78, respirations 15, blood   pressure 119/74, and saturating 94% on room air.  HEENT:  Normocephalic, atraumatic.  Pupils are equal, round and reactive to   light.  Anicteric sclerae.  Extraocular muscles are intact.  No cervical   lymphadenopathy appreciated.  Oropharynx is small with Mallampati score of 4.    Mucosa is moist, clear, without ulcerations or exudates.  PULMONARY:  Clear to auscultation anteriorly.  CARDIOVASCULAR:  Regular rate and rhythm without murmurs, rubs or gallops.  ABDOMEN:  Obese, positive bowel sounds, soft, nontender, nondistended.  EXTREMITIES:  No clubbing, cyanosis and +2-3 edema bilaterally in the lower   extremities.  NEUROLOGIC:  Cranial nerves II-XII intact.  SKIN:  No erythema or ulcerations.    LABORATORY:  WBC of 4.8, hemoglobin 13.1 and platelet 249.  Sodium 138,   potassium is 4.9, chloride 100, bicarbonate 34, BUN 40, creatinine 1.52,   glucose of 100, calcium 10.2.  Alkaline phosphatase _____, AST 23, ALT 9,   total bili of 1.6.  Lactic acid of 1.9.  Troponin negative x1.  BNP of 1188.    PT of 16.1, INR 1.32, PTT of 32.0.  ABG shows pH of 7.35, pCO2 of 60.8, and   pO2 of 90.3, saturating 96.7%.  Influenza A and B are negative.    EKG shows atrial fibrillation.  Chest x-ray shows stable cardiomegaly with   pulmonary vasculature prominence suggestive _____.    ASSESSMENT AND  PLAN:  A 72-year-old female with history of multiple medical   problems including chronic obstructive pulmonary disease and diastolic   congestive heart failure, now with findings of increasing shortness of breath   and findings concerning for chronic obstructive pulmonary disease exacerbation   and/or diastolic congestive heart failure exacerbation.  1.  Diastolic congestive heart failure exacerbation:  We will  increase the   metoprolol XR in lieu of the calcium channel blocker in light of fluid   retaining property of the calcium channel blocker.  We will continue with the   diuretics and follow.  2.  Chronic obstructive pulmonary disease exacerbation:  Provide supplementary   oxygen, respiratory protocol, incentive spirometry, Pneumovax and Fluvax as   appropriate.  3.  Bilateral lower extremity edema:  We will evaluate for DVT and if   negative, initiate TEDs and SCDs.  4.  Atrial fibrillation:  Patient is not on anticoagulation because of   increased risk of bleeding.  We will follow with current regimen.  5.  Chronic kidney disease stage III:  Follow with blood pressure and fluid   management.  6.  Morbid obesity.  Encourage dietary kilocalorie restrictions.  7.  Sleep apnea:  CPAP per home setting.  8.  Preventives:  Provide stool softener and DVT prophylaxis.    DISPOSITION:  Complex and guarded.    The management of this patient will likely require greater than 2 nights of   hospitalization.       ____________________________________     MD LIZETH GUILLEN / ROSCOE    DD:  12/26/2017 01:45:03  DT:  12/26/2017 03:35:19    D#:  [id number]  Job#:  [id number]    cc: AMALIA AUGUSTIN MD

## 2017-12-26 NOTE — CARE PLAN
Problem: Communication  Goal: The ability to communicate needs accurately and effectively will improve  Outcome: PROGRESSING AS EXPECTED  Pt calls appropriately, expresses needs and concerns to staff appropriately.    Problem: Knowledge Deficit  Goal: Knowledge of disease process/condition, treatment plan, diagnostic tests, and medications will improve  Outcome: PROGRESSING AS EXPECTED  POC discussed, all questions answered, no other concerns at this time.

## 2017-12-27 NOTE — CARE PLAN
Problem: Oxygenation:  Goal: Maintain adequate oxygenation dependent on patient condition    Intervention: Levels of oxygenation will improve to baseline  Pt is on 40LPM   FIO2 is 60%  SPO2 is 91%      Problem: Bronchoconstriction:  Goal: Improve in air movement and diminished wheezing    Intervention: Evaluate and manage medication effects  Xopenex Q4hrs

## 2017-12-27 NOTE — PROGRESS NOTES
[facility] Hospitalist Progress Note    Date of Service: 2017    Chief Complaint  72 y.o. female admitted 2017 with morbid obesity, COPD with chronic hypoxia requiring 8-11 L home oxygen on hospice, admitted for acute on chronic diastolic heart failure.    Interval Problem Update  Shortness of breath improving  States that she did participate at a group home over 2 weeks ago and was given very salty food, with increasing edema and shortness of breath  Improve strength      Consultants/Specialty  None    Disposition  PT/OT evaluation  Will need skilled nursing placement        Review of Systems   Constitutional: Negative for chills, diaphoresis, fever and malaise/fatigue.   HENT: Negative for congestion and hearing loss.    Respiratory: Positive for cough and shortness of breath. Negative for sputum production and wheezing.    Cardiovascular: Positive for leg swelling. Negative for chest pain and palpitations.   Gastrointestinal: Negative for abdominal pain, constipation, diarrhea, heartburn, nausea and vomiting.   Genitourinary: Negative for dysuria and flank pain.   Musculoskeletal: Negative for back pain, joint pain and myalgias.   Neurological: Positive for weakness. Negative for sensory change, speech change, focal weakness and headaches.   Psychiatric/Behavioral: Negative for depression and memory loss. The patient is not nervous/anxious.       Physical Exam  Laboratory/Imaging   Hemodynamics  Temp (24hrs), Av.9 °C (98.5 °F), Min:36.4 °C (97.5 °F), Max:37.4 °C (99.4 °F)   Temperature: 37.2 °C (99 °F)  Pulse  Av.1  Min: 50  Max: 105 Heart Rate (Monitored): 86  Blood Pressure : 106/82, NIBP: 118/72      Respiratory      Respiration: 18, Pulse Oximetry: 90 %, O2 Daily Delivery Respiratory : Highflow Nasal Cannula     Given By:: Mouthpiece, Work Of Breathing / Effort: Mild  RUL Breath Sounds: Diminished, RML Breath Sounds: Diminished, RLL Breath Sounds: Diminished, YODIT Breath Sounds: Diminished,  LLL Breath Sounds: Diminished    Fluids    Intake/Output Summary (Last 24 hours) at 12/26/17 1736  Last data filed at 12/26/17 1600   Gross per 24 hour   Intake              360 ml   Output              650 ml   Net             -290 ml       Nutrition  Orders Placed This Encounter   Procedures   • Diet Order     Standing Status:   Standing     Number of Occurrences:   1     Order Specific Question:   Diet:     Answer:   Regular [1]     Order Specific Question:   Calorie modifications:     Answer:   2000 kcals [5]     Physical Exam   Constitutional: She is oriented to person, place, and time. She appears well-nourished. No distress.   Morbid obesity   HENT:   Head: Normocephalic and atraumatic.   Nose: Nose normal.   Eyes: EOM are normal. Pupils are equal, round, and reactive to light. Right eye exhibits no discharge. Left eye exhibits no discharge.   Neck: Normal range of motion. Neck supple. No JVD present. No thyromegaly present.   Cardiovascular: Normal rate and intact distal pulses.    Murmur heard.  Pulmonary/Chest: Breath sounds normal. She is in respiratory distress. She has no wheezes. She has no rales.   Abdominal: Soft. Bowel sounds are normal. She exhibits no distension and no mass. There is no tenderness.   Musculoskeletal: She exhibits edema. She exhibits no tenderness.   Neurological: She is alert and oriented to person, place, and time. No cranial nerve deficit. Coordination normal.   Skin: Skin is warm and dry. No rash noted. She is not diaphoretic. No erythema.   Psychiatric: She has a normal mood and affect. Her behavior is normal. Judgment and thought content normal.   Nursing note and vitals reviewed.      Recent Labs      12/25/17   1750   WBC  4.8   RBC  4.40   HEMOGLOBIN  13.1   HEMATOCRIT  41.8   MCV  95.0   MCH  29.8   MCHC  31.3*   RDW  61.4*   PLATELETCT  249   MPV  10.5     Recent Labs      12/25/17   1750  12/26/17   0437   SODIUM  138  139   POTASSIUM  4.9  5.1   CHLORIDE  100  102    CO2  34*  32   GLUCOSE  100*  86   BUN  40*  38*   CREATININE  1.52*  1.46*   CALCIUM  10.2  10.2     Recent Labs      12/25/17   1750   APTT  33.0   INR  1.32*     Recent Labs      12/25/17   1750   BNPBTYPENAT  1188*              Assessment/Plan     Acute hypoxemic respiratory failure (CMS-HCC)   Assessment & Plan    With slight improvement today, baseline oxygen needs of 8-11 L nasal cannula  Was on hospice for respiratory failure and  COPD with chronic hypoxia  RT protocol  Now placed on high flow oxygen per RT  Per patient, shortness of breath improving  Chest x-ray with vascular congestion  Pro calcitonin is low  Influenza negative  On Lasix diuresis    Secondary to acute on chronic congestive heart failure, diastolic COPD  - palliative care consult, goals of care        Diastolic CHF (CMS-HCC)   Assessment & Plan    Acute on chronic  Follow-up echocardiogram  Continue Lasix, bb, isordil  Echo 11/2016, ef 60%  BNP 1188            COPD with acute exacerbation (CMS-HCC)   Assessment & Plan    Does not appear to be an exacerbation  Continue oxygen supplementation as needed   Not on steroids            Reviewed items::  Labs reviewed, Medications reviewed and Radiology images reviewed  DVT prophylaxis pharmacological::  Enoxaparin (Lovenox)  Ulcer Prophylaxis::  Not indicated

## 2017-12-27 NOTE — CARE PLAN
Problem: Oxygenation:  Goal: Maintain adequate oxygenation dependent on patient condition  Outcome: PROGRESSING AS EXPECTED    Intervention: Manage oxygen therapy by monitoring pulse oximetry and/or ABG values  Pt on HHFO NC on 40L and FIO2 80%; SPO2 97%      Problem: Bronchoconstriction:  Goal: Improve in air movement and diminished wheezing  Outcome: PROGRESSING AS EXPECTED    Intervention: Implement inhaled treatments  Xopenex QID

## 2017-12-27 NOTE — CARE PLAN
Problem: Safety  Goal: Will remain free from injury  Outcome: PROGRESSING AS EXPECTED  Pt remains free from falls or injuries. Bed alarm set and pt calls for assistance appropriately. Pt 3-3 assist for turning, pt bedbound.     Problem: Venous Thromboembolism (VTW)/Deep Vein Thrombosis (DVT) Prevention:  Goal: Patient will participate in Venous Thrombosis (VTE)/Deep Vein Thrombosis (DVT)Prevention Measures  Outcome: PROGRESSING AS EXPECTED  Pt free from s/sx of DVT. Pt receiving heparin for VTE prophylaxis.

## 2017-12-27 NOTE — PROGRESS NOTES
Bedside report received. POC discussed with pt; all questions answered at this time. Repositioned pt with Night RN, Leigh Ann.

## 2017-12-27 NOTE — PROGRESS NOTES
Paged on-call hospitalist Damon regarding pt's request for nasal decongestant and pain meds. Waiting for call back.

## 2017-12-27 NOTE — DOCUMENTATION QUERY
DOCUMENTATION QUERY    PROVIDERS: Please select “Cosign w/ note” to reply to query.    To better represent the severity of illness of your patient, please review the following information and exercise your independent professional judgment in responding to this query.     Atrial fibrillation is documented in the History and Physical. Based upon the clinical findings, risk factors, and treatment, can this diagnosis of atrial fibrillation be further specified?    • Paroxysmal atrial fibrillation  • Persistent atrial fibrillation  • Chronic atrial fibrillation  • Other explanation of clinical findings  • Unable to determine (no explanation for clinical findings)    The medical record reflects the following:   Clinical Findings H&P:  · EKG shows atrial fibrillation  · Atrial fibrillation, not on anticoagulation because of increased risk of bleeding     ED provider: EKG: atrial fibrillation at a rate of 94    12/25 EKG: atrial fibrillation, v-rate ; run of PVCs  12/26 EKG: atrial fibrillation, v-rate ; RSR' in V1 or V2, right VCD or RVH   Treatment EKGs  Metoprolol  Telemetry   Risk Factors Age  Hypertension  Acute on chronic diastolic heart failure  Sleep apnea  CKD, stage 3  Morbid obesity with BMI > 50   Location within medical record History and Physical and ED notes     Thank you,   Mecca Garcia, RN, BSN  Clinical   [phone]

## 2017-12-27 NOTE — ASSESSMENT & PLAN NOTE
Acute on chronic    Echo 11/2016, ef 60%  Repeat echo on this admission with ejection fraction of 55%    As above

## 2017-12-27 NOTE — PROGRESS NOTES
Hospitalist returned page. New orders received for morphine and nasal decongestant spray PRN. Will notify pt and administer.

## 2017-12-27 NOTE — RESPIRATORY CARE
COPD EDUCATION by COPD CLINICAL EDUCATOR  12/26/2017 at 8:30 AM by Lisa Anderson     Patient interviewed by COPD education team. Patient refused COPD program at this time.

## 2017-12-27 NOTE — DIETARY
Nutrition Services:   Consult received for diet education. Attempted to visit pt for heart failure diet education, pt was sleeping. Left heart failure diet education handout at bedside. RD will follow up for diet education.     RD available prn.

## 2017-12-27 NOTE — ASSESSMENT & PLAN NOTE
Increasing sob  Now requesting initiation of Comfort care measure, patient states that she wants to go now, d/w her POA, agree with proceeding with comfort care/dnr  Dc bipap  Morphine/ativan prn  O2 for comfort prn    MSOF- ARF, hyperkalemia, respiratory failure

## 2017-12-27 NOTE — PROGRESS NOTES
Chelsea Ojeda Fall Risk Assessment:     Last Known Fall: No falls  Mobility: Use of assistive device/requires assist of two people  Medications: Cardiovascular or central nervous system meds, Diuretics  Mental Status/LOC/Awareness: Awake, alert, and oriented to date, place, and person  Toileting Needs: Use of assistive device (Bedside commode, bedpan, urinal), Diarrhea/frequency/urgency  Volume/Electrolyte Status: No problems  Communication/Sensory: Visual (Glasses)/hearing deficit  Behavior: Appropriate behavior  Chelsea Ojeda Fall Risk Total: 17  Fall Risk Level: HIGH RISK    Universal Fall Precautions:  call light/belongings in reach, bed in low position and locked, wheelchairs and assistive devices out of sight, siderails up x 2, use non-slip footwear, clutter free and spill free environment, adequate lighting, educate on level of risk, educate to call for assistance    Fall Risk Level Interventions:    TRIAL (TELE 8, NEURO, MED MAAME 5) Moderate Fall Risk Interventions  Place yellow fall risk ID band on patient: verified  Provide patient/family education based on risk assessment : completed  Educate patient/family to call staff for assistance when getting out of bed: completed  Place fall precaution signage outside patient door: verified  Utilize bed/chair fall alarm: verifiedTRIAL (TELE 8, NEURO, MED MAAME 5) High Fall Risk Interventions  Place yellow fall risk ID band on patient: verified  Provide patient/family education based on risk assessment: completed  Educate patient/family to call staff for assistance when getting out of bed: completed  Place fall precaution signage outside patient door: verified  Place patient in room close to nursing station: verified  Utilize bed/chair fall alarm: verified  Notify charge of high risk for huddle: completed    Patient Specific Interventions:     Medication: review medications with patient and family and limit combination of prn medications  Mental  Status/LOC/Awareness: reinforce falls education, check on patient hourly, utilize bed/chair fall alarm and reinforce the use of call light  Toileting: provide frquent toileting, monitor intake and output/use of appropriate interventions and instruct patient/family on the use of grab bars  Volume/Electrolyte Status: monitor abnormal lab values  Communication/Sensory: update plan of care on whiteboard, ensure proper positioning when transferrng/ambulating, ensure patient has glasses/contacts and hearing aids/dentures and for visually impaired patients orient to their room surrounding and do not change their surroundings  Behavioral: encourage patient to voice feelings, engage patient in daily activities, administer medication as ordered and instruct/reinforce fall program rationale  Mobility: schedule physical activity throughout the day, utilize bed/chair fall alarm, ensure bed is locked and in lowest position and provide appropriate assistive device

## 2017-12-27 NOTE — PROGRESS NOTES
Pt care assumed. Pt lying comfortably in bed. A&O x 4. No distress present. Call light, phone, and bedside table within reach. White board updated and plan of care discussed with patient. Bed alarm set and pt calls for assistance appropriately. No concerns present at this time. Will continue to monitor.

## 2017-12-28 NOTE — CARE PLAN
Problem: Oxygenation:  Goal: Maintain adequate oxygenation dependent on patient condition  Outcome: PROGRESSING AS EXPECTED    Intervention: Manage oxygen therapy by monitoring pulse oximetry and/or ABG values  Pt on HFNC 50L and 80% FIO2; SPO2 92%      Problem: Bronchoconstriction:  Goal: Improve in air movement and diminished wheezing  Outcome: PROGRESSING AS EXPECTED    Intervention: Implement inhaled treatments  Xopenex QID

## 2017-12-28 NOTE — PROGRESS NOTES
Bedside report received. POC discussed with pt; all questions answered at this time. Assisted pt with bedpan; repositioned; pt reporting not sleeping well last night.

## 2017-12-28 NOTE — PROGRESS NOTES
Pt care assumed. Pt lying comfortably in bed. A&O x 4.  No distress present; no pain. Call light, phone, and bedside table within reach. White board updated and plan of care discussed with patient. Bed alarm set and pt calls for assistance appropriately. No concerns present at this time. Will continue to monitor.

## 2017-12-28 NOTE — ASSESSMENT & PLAN NOTE
Patient requesting additional snacks  Dietary consult for diabetic education and calorie count  Advised her to exercise

## 2017-12-28 NOTE — PROGRESS NOTES
[facility]ist Progress Note    Date of Service: 2017    Chief Complaint  72 y.o. female admitted 2017 with morbid obesity, COPD with chronic hypoxia requiring 8-11 L home oxygen on hospice, admitted for acute on chronic diastolic heart failure.    Interval Problem Update  Does not feel well today  Improving shortness of breath  Course of care discussed  Patient does not want hospice at this time      Consultants/Specialty  None    Disposition  PT/OT evaluation  Will need skilled nursing placement   Still requiring high flow oxygen supplementation        Review of Systems   Constitutional: Negative for malaise/fatigue.   HENT: Negative for hearing loss.    Respiratory: Positive for cough and shortness of breath. Negative for sputum production.    Cardiovascular: Positive for leg swelling. Negative for palpitations.   Gastrointestinal: Negative for abdominal pain, heartburn and vomiting.   Genitourinary: Negative for dysuria and flank pain.   Musculoskeletal: Negative for back pain, joint pain and myalgias.   Neurological: Positive for weakness. Negative for dizziness, focal weakness and headaches.   Psychiatric/Behavioral: Negative for memory loss. The patient is not nervous/anxious.       Physical Exam  Laboratory/Imaging   Hemodynamics  Temp (24hrs), Av.1 °C (98.7 °F), Min:36.9 °C (98.5 °F), Max:37.3 °C (99.1 °F)   Temperature: 36.9 °C (98.5 °F)  Pulse  Av.7  Min: 50  Max: 110    Blood Pressure : 111/78      Respiratory      Respiration: 16, Pulse Oximetry: 97 %, O2 Daily Delivery Respiratory : Highflow Nasal Cannula     Given By:: Mouthpiece, Work Of Breathing / Effort: Mild  RUL Breath Sounds: Diminished, RML Breath Sounds: Diminished, RLL Breath Sounds: Diminished, YODIT Breath Sounds: Diminished, LLL Breath Sounds: Diminished    Fluids    Intake/Output Summary (Last 24 hours) at 17 1636  Last data filed at 17 0930   Gross per 24 hour   Intake              120 ml   Output               100 ml   Net               20 ml       Nutrition  Orders Placed This Encounter   Procedures   • Diet Order     Standing Status:   Standing     Number of Occurrences:   1     Order Specific Question:   Diet:     Answer:   Regular [1]     Order Specific Question:   Calorie modifications:     Answer:   2000 kcals [5]     Physical Exam   Constitutional: She is oriented to person, place, and time. She appears well-nourished. No distress.   Morbid obesity   HENT:   Head: Normocephalic and atraumatic.   Mouth/Throat: No oropharyngeal exudate.   Eyes: EOM are normal. Pupils are equal, round, and reactive to light.   Neck: Normal range of motion. Neck supple. No thyromegaly present.   Cardiovascular: Normal rate and intact distal pulses.    Murmur heard.  Pulmonary/Chest: Breath sounds normal. She is in respiratory distress. She has no wheezes.   Abdominal: Soft. Bowel sounds are normal. She exhibits no distension. There is no tenderness.   Musculoskeletal: She exhibits edema.   Neurological: She is alert and oriented to person, place, and time. No cranial nerve deficit.   Skin: Skin is warm and dry.   Psychiatric: She has a normal mood and affect. Her behavior is normal. Judgment and thought content normal.   Nursing note and vitals reviewed.      Recent Labs      12/25/17   1750   WBC  4.8   RBC  4.40   HEMOGLOBIN  13.1   HEMATOCRIT  41.8   MCV  95.0   MCH  29.8   MCHC  31.3*   RDW  61.4*   PLATELETCT  249   MPV  10.5     Recent Labs      12/25/17   1750  12/26/17   0437  12/27/17   0325   SODIUM  138  139  140   POTASSIUM  4.9  5.1  4.9   CHLORIDE  100  102  103   CO2  34*  32  32   GLUCOSE  100*  86  95   BUN  40*  38*  37*   CREATININE  1.52*  1.46*  1.55*   CALCIUM  10.2  10.2  9.8     Recent Labs      12/25/17   1750   APTT  33.0   INR  1.32*     Recent Labs      12/25/17   1750   BNPBTYPENAT  1188*              Assessment/Plan     Chronic atrial fibrillation (HCC)- (present on admission)   Assessment & Plan     Rate control  Continue cardiac monitoring  Unclear why patient is not on anticoagulation, will discuss with patient        Chronic kidney disease (CKD), stage III (moderate)- (present on admission)   Assessment & Plan    At baseline  Monitor        Acute hypoxemic respiratory failure (CMS-HCC)   Assessment & Plan    With slight improvement today, baseline oxygen needs of 8-11 L nasal cannula  Was on hospice for respiratory failure and  COPD with chronic hypoxia  RT protocol  Now placed on high flow oxygen per RT  Per patient, shortness of breath improving  Chest x-ray with vascular congestion  Pro calcitonin is low  Influenza negative  On Lasix diuresis    Secondary to acute on chronic congestive heart failure, diastolic COPD  -12/27 goals of care discussed with patient  Patient was previously on hospice prior chronic respiratory failure however has decided to return to full code  Patient does have a POA who is her sister, patient would like a trial at mechanical support and ventilation as needed, if continued failure with transition to comfort care measures  Patient appears to be very clear with her calls knowing that her quality of life may not return to current status  Continue respiratory care, on high flow oxygen  Continue Lasix diuresis        Diastolic CHF (CMS-HCC)   Assessment & Plan    Acute on chronic  Follow-up echocardiogram  Continue Lasix, bb, isordil  Echo 11/2016, ef 60%  Repeat echo on this admission with ejection fraction of 55%  BNP 1188            COPD with acute exacerbation (CMS-HCC)   Assessment & Plan    Does not appear to be an exacerbation  Continue oxygen supplementation as needed   Not on steroids        Hypertension- (present on admission)   Assessment & Plan    Controlled  Essential  Patient refusing hydralazine and Isordil will DC        Morbid obesity with BMI of 60.0-69.9, adult (CMS-HCC)- (present on admission)   Assessment & Plan    Patient requesting additional snacks  Dietary  consult for diabetic education and calorie count  Advised her to exercise            Reviewed items::  Labs reviewed, Medications reviewed and Radiology images reviewed  DVT prophylaxis pharmacological::  Enoxaparin (Lovenox)  Ulcer Prophylaxis::  Not indicated

## 2017-12-28 NOTE — CARE PLAN
Problem: Knowledge Deficit  Goal: Knowledge of disease process/condition, treatment plan, diagnostic tests, and medications will improve  Outcome: PROGRESSING AS EXPECTED  Provided pt with HF packet; educated pt on calendar and how to use, diet, disease process, worsening s/s, when to seek medical attention, and importance of taking medications as prescribed; pt verbalized understanding.     Problem: Mobility  Goal: Risk for activity intolerance will decrease  Outcome: PROGRESSING AS EXPECTED  Pt up to edge of bed for 10 minutes x3 person assist; pt using bedpan q 1-2 hrs for urination and repositioned with pillows at that time; pt rolling/turning to use bedpan becoming easier for pt and staff.

## 2017-12-28 NOTE — THERAPY
Clinical swallow evaluation orders received and addressed. Per RN, pt is on 60L O2 via high flow nasal cannula at 80%. Spoke to MD who asked to hold eval until respiratory status improves. Will re-attempt evaluation when pt is able.

## 2017-12-28 NOTE — PROGRESS NOTES
RT called and at bedside for increased WOB; pt maxed out on hiflow, 100% oxygen, 70L; notified Dr. Che; new orders placed for stat ABG.

## 2017-12-28 NOTE — PROGRESS NOTES
[facility]ist Progress Note    Date of Service: 12/28/2017    Chief Complaint  72 y.o. female admitted 12/25/2017 with morbid obesity, COPD with chronic hypoxia requiring 8-11 L home oxygen on hospice, admitted for acute on chronic diastolic heart failure.    Interval Problem Update  Increasing sob overnight, requiring 80% HF  With lethargy   Arousable, cxr with ongoing edema  Stat ABG, denies CP  Elevated Ddimer, r/o PE  co2 retention on abg, d/w RN, for RT to cont continuous cpap      Consultants/Specialty  None    Disposition  PT/OT evaluation  Will need skilled nursing placement when stable    Stat CT PE study  May need transfer to ICU  Reiterated Goals of care, would like to cont full code and intubation if needed  Sister is poa    spent a total of 65 minutes of critical care time during this clinical encounter of which > 50% was devoted to counseling and coordinating care including review of records, pertinent lab data and studies, as well as discussing diagnostic evaluation and work up, planned therapeutic interventions and future disposition of care. Where indicated, the assessment and plan reflect discussion of patient with consultants, other healthcare providers, family members, and additional research needed to obtain further information in formulating the plan of care of this patient. This time includes no procedures or overlap with other providers.         Review of Systems   Constitutional: Positive for malaise/fatigue. Negative for chills, diaphoresis and fever.   HENT: Negative for hearing loss.    Respiratory: Positive for cough and shortness of breath. Negative for sputum production.    Cardiovascular: Positive for leg swelling. Negative for chest pain and palpitations.   Gastrointestinal: Negative for abdominal pain, nausea and vomiting.   Genitourinary: Negative for dysuria and flank pain.   Musculoskeletal: Negative for back pain, joint pain and myalgias.   Neurological: Positive for  weakness. Negative for focal weakness and headaches.   Psychiatric/Behavioral: Negative for depression. The patient is not nervous/anxious.       Physical Exam  Laboratory/Imaging   Hemodynamics  Temp (24hrs), Av.9 °C (98.5 °F), Min:36.7 °C (98 °F), Max:37.3 °C (99.1 °F)   Temperature: 36.8 °C (98.3 °F)  Pulse  Av  Min: 50  Max: 125    Blood Pressure : 100/74      Respiratory      Respiration: 20, Pulse Oximetry: 93 %, O2 Daily Delivery Respiratory : Highflow Nasal Cannula     Given By:: Mouthpiece, Work Of Breathing / Effort: Mild  RUL Breath Sounds: Diminished, RML Breath Sounds: Diminished, RLL Breath Sounds: Diminished, YODIT Breath Sounds: Diminished, LLL Breath Sounds: Diminished    Fluids    Intake/Output Summary (Last 24 hours) at 17 1742  Last data filed at 17 0800   Gross per 24 hour   Intake              120 ml   Output              400 ml   Net             -280 ml       Nutrition  Orders Placed This Encounter   Procedures   • DIET NPO     Standing Status:   Standing     Number of Occurrences:   1     Order Specific Question:   Restrict to:     Answer:   Strict [1]     Physical Exam   Constitutional: She is oriented to person, place, and time. She appears well-nourished. No distress.   Morbid obesity   HENT:   Head: Normocephalic and atraumatic.   Mouth/Throat: No oropharyngeal exudate.   Eyes: EOM are normal. Pupils are equal, round, and reactive to light. No scleral icterus.   Neck: Normal range of motion. No thyromegaly present.   Cardiovascular: Normal rate and intact distal pulses.    Murmur heard.  Pulmonary/Chest: Breath sounds normal. She is in respiratory distress. She has no wheezes. She has no rales.   Decreased b/l   Abdominal: Soft. Bowel sounds are normal. She exhibits no distension. There is no tenderness.   Musculoskeletal: She exhibits edema.   Neurological: She is oriented to person, place, and time. No cranial nerve deficit. Coordination normal.   Lethargic, arousable    Skin: Skin is warm and dry. She is not diaphoretic.   Psychiatric: She has a normal mood and affect. Her behavior is normal. Judgment and thought content normal.   Nursing note and vitals reviewed.      Recent Labs      12/25/17   1750  12/28/17   0912   WBC  4.8  6.3   RBC  4.40  4.19*   HEMOGLOBIN  13.1  12.5   HEMATOCRIT  41.8  39.5   MCV  95.0  94.3   MCH  29.8  29.8   MCHC  31.3*  31.6*   RDW  61.4*  59.2*   PLATELETCT  249  233   MPV  10.5  11.2     Recent Labs      12/26/17   0437  12/27/17   0325  12/28/17   0912   SODIUM  139  140  139   POTASSIUM  5.1  4.9  5.4   CHLORIDE  102  103  102   CO2  32  32  33   GLUCOSE  86  95  125*   BUN  38*  37*  36*   CREATININE  1.46*  1.55*  1.45*   CALCIUM  10.2  9.8  9.5     Recent Labs      12/25/17   1750   APTT  33.0   INR  1.32*     Recent Labs      12/25/17   1750  12/28/17   0912   BNPBTYPENAT  1188*  2105*              Assessment/Plan     Chronic atrial fibrillation (HCC)- (present on admission)   Assessment & Plan    Rate control  Continue cardiac monitoring  Unclear why patient is not on anticoagulation, will discuss with patient        Chronic kidney disease (CKD), stage III (moderate)- (present on admission)   Assessment & Plan    At baseline  Monitor        Acute hypoxemic respiratory failure (CMS-HCC)   Assessment & Plan    With slight improvement today, baseline oxygen needs of 8-11 L nasal cannula  Was on hospice for respiratory failure and  COPD with chronic hypoxia  RT protocol  Now placed on high flow oxygen per RT  Per patient, shortness of breath improving  Chest x-ray with vascular congestion  Pro calcitonin is low  Influenza negative  On Lasix diuresis    Secondary to acute on chronic congestive heart failure, diastolic COPD  -12/27 goals of care discussed with patient  Patient was previously on hospice prior chronic respiratory failure however has decided to return to full code  Patient does have a POA who is her sister, patient would like a  trial at mechanical support and ventilation as needed, if continued failure with transition to comfort care measures  Patient appears to be very clear with her calls knowing that her quality of life may not return to current status  Continue respiratory care, on high flow oxygen  Continue Lasix diuresis    12/28: Acute worsening hypoxic respiratory failure now on 100%HF O2, with labored breathing  Will repeat CXR, bp stable  F/u stat ABG, cbc, ddimer, bmp, trop  - f/u EKG  -  CT pe study, high risk for pE, though this could be just her decompensating severe COPD  - cont lasix, start high dose IV steroids  Prior h/o residual lung function at 5%  - pulmonary consult, may benefit from bipap assistance, again pt would like to remain full code and intubation as need          Diastolic CHF (CMS-HCC)   Assessment & Plan    Acute on chronic  Follow-up echocardiogram  Continue Lasix, bb, isordil  Echo 11/2016, ef 60%  Repeat echo on this admission with ejection fraction of 55%  BNP 1188            COPD with acute exacerbation (CMS-HCC)   Assessment & Plan    Does not appear to be an exacerbation  Continue oxygen supplementation as needed   Not on steroids        Hypertension- (present on admission)   Assessment & Plan    Controlled  Essential  Patient refusing hydralazine and Isordil will DC        Morbid obesity with BMI of 60.0-69.9, adult (CMS-HCC)- (present on admission)   Assessment & Plan    Patient requesting additional snacks  Dietary consult for diabetic education and calorie count  Advised her to exercise            Reviewed items::  Labs reviewed, Medications reviewed and Radiology images reviewed  DVT prophylaxis pharmacological::  Enoxaparin (Lovenox)  Ulcer Prophylaxis::  Not indicated

## 2017-12-28 NOTE — PROGRESS NOTES
Chelsea Ojeda Fall Risk Assessment:     Last Known Fall: No falls  Mobility: Use of assistive device/requires assist of two people  Medications: Cardiovascular or central nervous system meds, Diuretics  Mental Status/LOC/Awareness: Awake, alert, and oriented to date, place, and person  Toileting Needs: Use of assistive device (Bedside commode, bedpan, urinal), Diarrhea/frequency/urgency  Volume/Electrolyte Status: No problems  Communication/Sensory: Visual (Glasses)/hearing deficit  Behavior: Appropriate behavior  Chelsea Ojeda Fall Risk Total: 17  Fall Risk Level: HIGH RISK    Universal Fall Precautions:  call light/belongings in reach, bed in low position and locked, wheelchairs and assistive devices out of sight, siderails up x 2, use non-slip footwear, adequate lighting, clutter free and spill free environment, educate on level of risk, educate to call for assistance    Fall Risk Level Interventions:    TRIAL (TELE 8, NEURO, MED MAAME 5) Moderate Fall Risk Interventions  Place yellow fall risk ID band on patient: verified  Provide patient/family education based on risk assessment : completed  Educate patient/family to call staff for assistance when getting out of bed: completed  Place fall precaution signage outside patient door: verified  Utilize bed/chair fall alarm: verifiedTRIAL (TELE 8, NEURO, MED MAAME 5) High Fall Risk Interventions  Place yellow fall risk ID band on patient: verified  Provide patient/family education based on risk assessment: completed  Educate patient/family to call staff for assistance when getting out of bed: completed  Place fall precaution signage outside patient door: verified  Place patient in room close to nursing station: verified  Utilize bed/chair fall alarm: verified  Notify charge of high risk for huddle: completed    Patient Specific Interventions:     Medication: review medications with patient and family  Mental Status/LOC/Awareness: reinforce falls education, check on  patient hourly, utilize bed/chair fall alarm, reinforce the use of call light and provide activity  Toileting: provide frquent toileting and monitor intake and output/use of appropriate interventions  Volume/Electrolyte Status: ensure patient remains hydrated  Communication/Sensory: update plan of care on whiteboard  Behavioral: encourage patient to voice feelings, engage patient in daily activities, initiate plan of care for alcohol withdrawal and instruct/reinforce fall program rationale  Mobility: utilize bed/chair fall alarm, ensure bed is locked and in lowest position and provide appropriate assistive device

## 2017-12-28 NOTE — CONSULTS
"Reason for PC Consult: Advance Care Planning    Consulted by: Dr. Che    Assessment:  General:   72 yr female admitted on 12/26/17 with SOB.    PAST MEDICAL HISTORY:  1.  Recent antibiotic treatment.  2.  Morbid obesity with BMI greater than 56.  3.  COPD/asthma.  4.  Chronic kidney disease, stage III.  5.  Atrial flutter.  6.  Sleep apnea, on BiPAP.  7.  Diastolic congestive heart failure.  Palliative Care referral for GOC, code status and advance care planning.      Social- Patient currently resides at a group home.  She has a son in [place], who is stated to not be involved, and 2 sons in [place].  Her sister, Dianne resides in [place] [phone].  Massage left requesting PC back- sister is stated to be DPOA.    Dyspnea: Yes- 93% 60L/HFNC  Last BM: 12/27/17- Per RN assessment  Pain: No-    Depression: Mood appropriate for situation-    Dementia: No;       Spiritual:  Is Amish or spirituality important for coping with this illness? No-    Has a  or spiritual provider visit been requested? No    Palliative Performance Scale: 10%    Advance Directive: None on FIle  DPOA: None on File- Dianne FERRARI, sister ([phone])  POLST: None on File    Code Status: Full-  Discussed with patient who wishes to remain a Full code. If intubated and unable to successfully extubate, she would NOT want a trach placement and understands comfort care.    Outcome:  I met with the patient at bedside and introduced myself and the role of Palliative Care.  Patient stated understanding and agreed to discussed her current status.  Patient states that she was in her usual state of health and within 1 week she got SOB and then \"could no longer get out of bed\".  Patient states that she wants to get stronger and get out of bed but does not want to return to the group home.  Her goal is to get [facility] placement in a SNF.      Code status was reviewed and her wish is to remain a full code.  We discussed if she were to survive " "CPR, intubation would she want a trach placement.  She adamantly stated \"No\".  I explained comfort measures in detail, if she were intubated and weaning was unsuccessful; she stated understanding and agreed that after 2 weeks of life sustaining treatment if she were unable to successfully withdraw from vent she would want comfort care. NO trach placement.        Updated: JOSE R Nassar    Plan: Ongoing treatment, placement a SNF.  Patient has Medicare w/o medicaid.    Recommendations: Hospice is inappropriate at this time as patient wants treatment.    Thank you for allowing Palliative Care to participate in this patient's care. Please feel free to call [phone] with any questions or concerns.  "

## 2017-12-28 NOTE — PROGRESS NOTES
Chelsea Ojeda Fall Risk Assessment:     Last Known Fall: No falls  Mobility: Use of assistive device/requires assist of two people  Medications: Cardiovascular or central nervous system meds, Diuretics  Mental Status/LOC/Awareness: Awake, alert, and oriented to date, place, and person  Toileting Needs: Use of assistive device (Bedside commode, bedpan, urinal), Incontinence, Diarrhea/frequency/urgency  Volume/Electrolyte Status: No problems  Communication/Sensory: Visual (Glasses)/hearing deficit  Behavior: Appropriate behavior  Chelsea Ojeda Fall Risk Total: 20  Fall Risk Level: HIGH RISK    Universal Fall Precautions:  call light/belongings in reach, bed in low position and locked, wheelchairs and assistive devices out of sight, siderails up x 2, adequate lighting, use non-slip footwear, clutter free and spill free environment, educate on level of risk, educate to call for assistance    Fall Risk Level Interventions:    TRIAL (TELE 8, NEURO, MED MAAME 5) Moderate Fall Risk Interventions  Place yellow fall risk ID band on patient: verified  Provide patient/family education based on risk assessment : completed  Educate patient/family to call staff for assistance when getting out of bed: completed  Place fall precaution signage outside patient door: verified  Utilize bed/chair fall alarm: verifiedTRIAL (TELE 8, NEURO, MED MAAME 5) High Fall Risk Interventions  Place yellow fall risk ID band on patient: verified  Provide patient/family education based on risk assessment: completed  Educate patient/family to call staff for assistance when getting out of bed: completed  Place fall precaution signage outside patient door: verified  Place patient in room close to nursing station: verified  Utilize bed/chair fall alarm: verified  Notify charge of high risk for huddle: completed    Patient Specific Interventions:     Medication: review medications with patient and family  Mental Status/LOC/Awareness: reinforce falls  education, check on patient hourly, utilize bed/chair fall alarm and reinforce the use of call light  Toileting: provide frquent toileting, monitor intake and output/use of appropriate interventions, instruct patient/family on the use of grab bars and instruct patient/family on the need to call for assistance when toileting  Volume/Electrolyte Status: administer medications as ordered for nausea and vomiting and monitor abnormal lab values  Communication/Sensory: update plan of care on whiteboard, ensure proper positioning when transferrng/ambulating, ensure patient has glasses/contacts and hearing aids/dentures and for visually impaired patients orient to their room surrounding and do not change their surroundings  Behavioral: encourage patient to voice feelings, engage patient in daily activities, administer medication as ordered and instruct/reinforce fall program rationale  Mobility: utilize bed/chair fall alarm, ensure bed is locked and in lowest position, provide appropriate assistive device and collaborate with doctor for possible PT/OT consult

## 2017-12-28 NOTE — CARE PLAN
Problem: Safety  Goal: Will remain free from injury  Outcome: PROGRESSING AS EXPECTED  Pt remains free from falls or injuries. Bed alarm set and pt calls for assistance appropriately.     Problem: Venous Thromboembolism (VTW)/Deep Vein Thrombosis (DVT) Prevention:  Goal: Patient will participate in Venous Thrombosis (VTE)/Deep Vein Thrombosis (DVT)Prevention Measures  Outcome: PROGRESSING AS EXPECTED  Pt free from s/sx of DVT. Pt receiving heparin for VTE prophylaxis.     Problem: Respiratory:  Goal: Respiratory status will improve  Outcome: PROGRESSING SLOWER THAN EXPECTED  Pt has increased respiratory needs. Pt has albuterol and respiratory protocol PRN. Pt calls for respiratory needs, as needed. Pt connected to  and monitored closely.

## 2017-12-29 NOTE — PROGRESS NOTES
[facility] Hospitalist Progress Note    Date of Service: 12/29/2017    Chief Complaint  72 y.o. female admitted 12/25/2017 with morbid obesity, COPD with chronic hypoxia requiring 8-11 L home oxygen on hospice, admitted for acute on chronic diastolic heart failure.    Interval Problem Update  Shortness of breath slightly improved  Patient is now on 50% high flow oxygen  More awake today  Patient is noted to be hyperkalemic with potassium of 6.6  Patient initially refused medication administration for hyperkalemia  Patient is found to have acute renal failure  Nephrology as well as pulmonary has been consulted  After a long discussion with patient and consultants as well as her POA patient has now decided to resume DNR/DNI status with no plans for dialysis  Hospice has been consulted    Patient agreeable to this, does not want any further invasive measures      Consultants/Specialty  None    Disposition  PT/OT evaluation  Will need skilled nursing placement when stable on hospice when accepted    DC CT PE study  Discussed with RN and staph    Sister is poa    spent a total of 85 minutes of critical care time during this clinical encounter of which > 50% was devoted to counseling and coordinating care including review of records, pertinent lab data and studies, as well as discussing diagnostic evaluation and work up, planned therapeutic interventions and future disposition of care. Where indicated, the assessment and plan reflect discussion of patient with consultants, other healthcare providers, family members, and additional research needed to obtain further information in formulating the plan of care of this patient. This time includes no procedures or overlap with other providers.     I discussed advance care planning with the patient's family for at least 26 minutes, including diagnosis, prognosis, plan of care, risks and benefits of any therapies that could be offered, as well as alternatives including palliation  and hospice, as appropriate. Patient has been made a DNR now. BILL CODE 38309.    Review of Systems   Constitutional: Negative for chills, diaphoresis, fever and malaise/fatigue.   HENT: Negative for congestion.    Eyes: Negative for blurred vision.   Respiratory: Positive for cough and shortness of breath. Negative for sputum production.    Cardiovascular: Positive for leg swelling. Negative for chest pain and palpitations.   Gastrointestinal: Negative for abdominal pain, constipation, diarrhea, nausea and vomiting.   Genitourinary: Negative for dysuria and flank pain.   Musculoskeletal: Positive for myalgias. Negative for back pain.   Neurological: Positive for weakness. Negative for dizziness, focal weakness and headaches.   Psychiatric/Behavioral: Positive for memory loss. Negative for depression. The patient is not nervous/anxious.       Physical Exam  Laboratory/Imaging   Hemodynamics  Temp (24hrs), Av.6 °C (97.8 °F), Min:36.3 °C (97.3 °F), Max:36.7 °C (98.1 °F)   Temperature: 36.7 °C (98.1 °F)  Pulse  Av.3  Min: 50  Max: 125    Blood Pressure : 109/66      Respiratory      Respiration: 18, Pulse Oximetry: 90 %, O2 Daily Delivery Respiratory : Highflow Nasal Cannula     Given By::  (Aerogen), Work Of Breathing / Effort: Mild  RUL Breath Sounds: Diminished, RML Breath Sounds: Diminished, RLL Breath Sounds: Diminished, YODIT Breath Sounds: Diminished, LLL Breath Sounds: Diminished    Fluids    Intake/Output Summary (Last 24 hours) at 17 1634  Last data filed at 17 1300   Gross per 24 hour   Intake              240 ml   Output              300 ml   Net              -60 ml       Nutrition  Orders Placed This Encounter   Procedures   • DIET ORDER     Standing Status:   Standing     Number of Occurrences:   1     Order Specific Question:   Diet:     Answer:   2 Gram Sodium [7]     Order Specific Question:   Texture/Fiber modifications:     Answer:   Dysphagia 2(Pureed/Chopped)specify fluid  consistency(question 6) [2]     Order Specific Question:   Consistency/Fluid modifications:     Answer:   Nectar Thick [2]     Physical Exam   Constitutional: She is oriented to person, place, and time. She appears well-nourished. No distress.   Morbid obesity   HENT:   Head: Normocephalic and atraumatic.   Eyes: EOM are normal. Pupils are equal, round, and reactive to light.   Neck: Normal range of motion. No JVD present. No thyromegaly present.   Cardiovascular: Normal rate and intact distal pulses.    Murmur heard.  Pulmonary/Chest: She is in respiratory distress. She has no wheezes. She has rales.   Decreased b/l   Abdominal: Soft. Bowel sounds are normal. She exhibits no distension.   Musculoskeletal: She exhibits edema. She exhibits no tenderness.   Neurological: She is oriented to person, place, and time. No cranial nerve deficit. Coordination normal.   Lethargic, arousable   Skin: Skin is warm and dry. No rash noted. No erythema.   Psychiatric: She has a normal mood and affect. Her behavior is normal. Thought content normal.   Nursing note and vitals reviewed.      Recent Labs      12/28/17   0912   WBC  6.3   RBC  4.19*   HEMOGLOBIN  12.5   HEMATOCRIT  39.5   MCV  94.3   MCH  29.8   MCHC  31.6*   RDW  59.2*   PLATELETCT  233   MPV  11.2     Recent Labs      12/28/17   0912  12/29/17   0400  12/29/17   0628  12/29/17   1029   SODIUM  139  138   --   138   POTASSIUM  5.4  6.7*  6.6*  6.3*   CHLORIDE  102  99   --   100   CO2  33  31   --   29   GLUCOSE  125*  117*   --   178*   BUN  36*  44*   --   48*   CREATININE  1.45*  1.91*   --   2.07*   CALCIUM  9.5  10.1   --   10.1         Recent Labs      12/28/17   0912   BNPBTYPENAT  2105*              Assessment/Plan     Chronic atrial fibrillation (HCC)- (present on admission)   Assessment & Plan    Rate control  Continue cardiac monitoring  Unclear why patient is not on anticoagulation, will discuss with patient        ARF (acute renal failure) (CMS-HCC)-  (present on admission)   Assessment & Plan    With CKD 3-4  On lasix  Worsening renal function  Nephrology consult, may need HD for fluid removal  BMP at 10 a        Hyperkalemia- (present on admission)   Assessment & Plan    With ARF  - to receive, kayexalate, d5 and insulin, however pt refusing  - will address    Administer stat dose of albuterol, nephro consulted.  On lasix    - will reconsult palliative care to discuss goals of care, now with [facility], prognosis poor, with noncompliance  Remains full code        Chronic kidney disease (CKD), stage III (moderate)   Assessment & Plan    At baseline  Monitor        Acute hypoxemic respiratory failure (CMS-[facility])   Assessment & Plan    With slight improvement today, baseline oxygen needs of 8-11 L nasal cannula  Was on hospice for respiratory failure and  COPD with chronic hypoxia  RT protocol  Now placed on high flow oxygen per RT  Per patient, shortness of breath improving  Chest x-ray with vascular congestion  Pro calcitonin is low  Influenza negative  On Lasix diuresis    Secondary to acute on chronic congestive heart failure, diastolic COPD  -12/27 goals of care discussed with patient  Patient was previously on hospice prior chronic respiratory failure however has decided to return to full code  Patient does have a POA who is her sister, patient would like a trial at mechanical support and ventilation as needed, if continued failure with transition to comfort care measures  Patient appears to be very clear with her calls knowing that her quality of life may not return to current status  Continue respiratory care, on high flow oxygen  Continue Lasix diuresis    12/28: Acute worsening hypoxic respiratory failure now on 100%HF O2, with labored breathing  Will repeat CXR, bp stable  F/u stat ABG, cbc, ddimer, bmp, trop  - f/u EKG  -  CT pe study, high risk for pE, though this could be just her decompensating severe COPD  - cont lasix, start high dose IV steroids  Prior  h/o residual lung function at 5%  - pulmonary consult, may benefit from bipap assistance, again pt would like to remain full code and intubation as need    12/29- slight improvement, on cpap overnight  Now on 50% HF  Pulmonary consult, may benefit from continuous cpap        Diastolic CHF (CMS-HCC)   Assessment & Plan    Acute on chronic  Follow-up echocardiogram  Continue Lasix, bb, isordil  Echo 11/2016, ef 60%  Repeat echo on this admission with ejection fraction of 55%  BNP 1188            COPD with acute exacerbation (CMS-HCC)   Assessment & Plan    Does not appear to be an exacerbation  Continue oxygen supplementation as needed   Not on steroids        Hypertension- (present on admission)   Assessment & Plan    Controlled  Essential  Patient refusing hydralazine and Isordil will DC        Morbid obesity with BMI of 60.0-69.9, adult (CMS-HCC)- (present on admission)   Assessment & Plan    Patient requesting additional snacks  Dietary consult for diabetic education and calorie count  Advised her to exercise            Reviewed items::  Labs reviewed, Medications reviewed and Radiology images reviewed  DVT prophylaxis pharmacological::  Enoxaparin (Lovenox)  Ulcer Prophylaxis::  Not indicated

## 2017-12-29 NOTE — CONSULTS
Consults   Chief Complaint   Patient presents with   • Shortness of Breath     onset today.  pt on home O2 at 16l per NC, per EMS report O2 SAT 80% on home O2.  speaking full clear sentences at this time.  reports thick green sputum and was started on oral antibiotics X 3 days without relief     Reason for consult: Hyperkalemia, QUINN  Requesting provider: Dr. Che    HPI: 72 year old with known CKD stage 3, and has been on hospice in the past, who came in with increasing SOB and respiratory failure. The patient has had a progressive course and increasing oxygen requirements with CXR with edema. She was found to have hyperkalemia, for which we were called. She was on PO KCl. Oliguric. Despite medical therapy K only down to 6.6.    Past Medical History:   Diagnosis Date   • Anesthesia     ponv, headache   • Aspirin long-term use     low dose, per Dr. Butler   • Atrial flutter (CMS-[facility])    • Chronic bronchitis (CMS-HCC)    • CKD (chronic kidney disease) stage 3, GFR 30-59 ml/min    • Colon adenomas    • Congestive heart failure (CMS-[facility])    • COPD with asthma (CMS-HCC)    • Diastolic heart failure    • Echocardiogram abnormal     10/22/11 EF 6-65%, 2plus MR, 1 plus TR, Normal pulmonary  pressures.   • Essential hypertension, benign    • Hypoxemia    • Morbid obesity (CMS-[facility])    • Primary pulmonary hypertension (CMS-[facility])    • Snoring    • Unspecified cataract    • Unspecified sleep apnea     uses Bipap       Social History   Substance Use Topics   • Smoking status: Former Smoker     Packs/day: 0.50     Years: 3.00     Types: Cigarettes     Quit date: 3/1/1972   • Smokeless tobacco: Never Used   • Alcohol use No       Family History   Problem Relation Age of Onset   • Heart Disease Neg Hx    • Other Mother      brain aneurysm/trauma   • Dementia Sister      early   • Hypertension Sister    • Hyperlipidemia Sister    • Cancer Brother      jaw       Allergies   Allergen Reactions   • Angiotensin Receptor Blockers    •  "Atorvastatin Myalgia   • Codeine    • Niacin Itching   • Pradaxa      Severe bleeding tendency   • Red Yeast Rice [Misc Terri Hmg Coa Reduct Inhib] Myalgia   • Rosuvastatin Myalgia   • Statins [Hmg-Coa-R Inhibitors]    • Hydrocodone Unspecified     Headache. No reaction to Tylenol.       Review of Systems   Constitutional: Positive for malaise/fatigue.   Respiratory: Positive for shortness of breath.    Neurological: Positive for weakness.       Encounter Vitals  Standard Vitals  Vitals  Blood Pressure : 103/60  Temperature: 36.3 °C (97.3 °F)  Pulse: 92  Respiration: 20  Pulse Oximetry: 92 %  Height: 170.2 cm (5' 7\")  Weight: (!) 158.3 kg (348 lb 15.8 oz)  Encounter Vitals  Temperature: 36.3 °C (97.3 °F)  Temp Source: Temporal  Orthostatics: Supine  Blood Pressure : 103/60  BP Location: Left, Upper Arm  Patient BP Position: Supine  Pulse: 92  Respiration: 20  Pulse Oximetry: 92 %  O2 (LPM): 60  O2 Delivery: High Flow Nasal Cannula  Weight: (!) 158.3 kg (348 lb 15.8 oz)  How Weight Obtained: Bed Scale  Height: 170.2 cm (5' 7\")  BMI (Calculated): 52.83  Location: Shoulder  Description: Aching  Breastfeeding Status: No  Pulmonary-Specific Vitals     Durable Medical Equipment-Specific Vitals  DME  O2 (LPM): 60  O2 Delivery: High Flow Nasal Cannula          Physical Exam   Constitutional: She is oriented to person, place, and time. She appears toxic. She has a sickly appearance.   Cardiovascular: Normal rate and regular rhythm.    Pulmonary/Chest: She is in respiratory distress. She has rales.   Abdominal: Soft. Bowel sounds are normal.   Musculoskeletal: She exhibits edema. She exhibits no tenderness.   Neurological: She is alert and oriented to person, place, and time.   Skin: Skin is warm and dry.       LABS:  Recent Labs      12/28/17   0912   WBC  6.3   RBC  4.19*   HEMOGLOBIN  12.5   HEMATOCRIT  39.5   MCV  94.3   MCH  29.8   RDW  59.2*   PLATELETCT  233   MPV  11.2   NEUTSPOLYS  68.40   LYMPHOCYTES  13.40* "   MONOCYTES  14.70*   EOSINOPHILS  2.90   BASOPHILS  0.30     Recent Labs      12/27/17   0325  12/28/17   0912  12/29/17   0400  12/29/17   0628   SODIUM  140  139  138   --    POTASSIUM  4.9  5.4  6.7*  6.6*   CHLORIDE  103  102  99   --    CO2  32  33  31   --    GLUCOSE  95  125*  117*   --    BUN  37*  36*  44*   --          Assessment:  1. QUINN, progressive, on top of known CKD 3, worse  2. Hyperkalemia, new, was on PO KCl, only slightly reduced with medical management  3. Alkalosis  4. Respiratory failure  5. Severe, end stage COPD    Plan:  -Had a long discussion with the patient, and with sister (Dianne) per request of the patient  -Explained her current situation, and QUINN, as well as hyperkalemia. Given her overall poor condition, we have decided on no dialysis  -Furthermore, the patient wishes to discuss hospice again, she did not like the previous hospice situation where she was placed in a group home setting.   -Patient wants to be DNR, no dialysis  -d/w Dr. Che  -For now, continue supportive care including kayexcelate and increasing dose of Lasix

## 2017-12-29 NOTE — CONSULTS
Pulmonary Consultation       Patient ID:   Name:             Michela Humphries   YOB: 1945  Age:                 72 y.o.  female   MRN:               [id number]                                                  Reason for Consult:  Respiratory failure with pulmonary edema and acute renal failure    Requesting physician:  Dr. Brittany Che    History of Present Illness:  This lady was being considered for possible BiPAP or even intubation with severe respiratory failure, pulmonary edema, acute CO2 retention, with underlying COPD as well as sleep apnea, morbid obesity and now complicating pulmonary edema. We discussed transfer to ICU, but with acute renal failure, nephrology indicated that intervention or dialysis would not be appropriate, and after discussion and conference with the patient, decided against utilizing either dialysis, intensive care unit administration's, intubation or BiPAP.  She is remarkably coherent, has lived in [place] most of her life, grew up in [place]. She worked at [employer] as a . We have a number of mutual friends and acquaintances. She confirms a desire not to be on life support, and our goals will be to provide oxygen at high flow, nighttime BiPAP for sleep apnea predominantly, diuresis if possible and nephrology is following closely.    The review of systems from a pulmonary standpoint does not include fever chills or sweats she is not coughing blood or mucopurulence there is no signs of acute coronary syndrome or cardiac chest pain that might be contributing. GI and  history are noted. Neuropsychiatric does not include new weakness, she is coherent and interactive    ROS:  Complete ROS was done and was negative except what mentioned in HPI     Past Medical History:  Past Medical History:   Diagnosis Date   • Anesthesia     ponv, headache   • Aspirin long-term use     low dose, per Dr. Butler   • Atrial flutter (CMS-[facility])    • Chronic bronchitis  (CMS-HCC)    • CKD (chronic kidney disease) stage 3, GFR 30-59 ml/min    • Colon adenomas    • Congestive heart failure (CMS-HCC)    • COPD with asthma (CMS-HCC)    • Diastolic heart failure    • Echocardiogram abnormal     10/22/11 EF 6-65%, 2plus MR, 1 plus TR, Normal pulmonary  pressures.   • Essential hypertension, benign    • Hypoxemia    • Morbid obesity (CMS-HCC)    • Primary pulmonary hypertension (CMS-HCC)    • Snoring    • Unspecified cataract    • Unspecified sleep apnea     uses Bipap       Past surgical history:  Past Surgical History:   Procedure Laterality Date   • RECOVERY  6/16/2016    Procedure: CATH LAB, [facility], ;  Surgeon: Anna Surgery;  Location: SURGERY PRE-POST PROC UNIT [facility];  Service:    • COLONOSCOPY  3/30/2016    Procedure: COLONOSCOPY WITH BIOPSY;  Surgeon: Daniel Eli M.D.;  Location: SURGERY [facility];  Service:    • RECOVERY  1/16/2012    Performed by SURGERY, CATH-RECOVERY at SURGERY SAME DAY [facility]   • KNEE ARTHROSCOPY     • SHOULDER SURGERY         Family History:  Family History   Problem Relation Age of Onset   • Heart Disease Neg Hx    • Other Mother      brain aneurysm/trauma   • Dementia Sister      early   • Hypertension Sister    • Hyperlipidemia Sister    • Cancer Brother      [facility] Medications:    Current Facility-Administered Medications:   •  methylPREDNISolone sod succ (SOLU-MEDROL) 125 MG injection 125 mg, 125 mg, Intravenous, Q8HRS, Brittany Che D.O.  •  levalbuterol (XOPENEX) 1.25 MG/3ML nebulizer solution 1.25 mg, 1.25 mg, Nebulization, 4X/DAY (RT), Brittany Che, D.O., 1.25 mg at 12/29/17 1105  •  DEXTROSE 50 % IV SOLN, , , , , Stopped at 12/29/17 0830  •  furosemide (LASIX) injection 80 mg, 80 mg, Intravenous, Q DAY, Brittany Che D.O., 80 mg at 12/29/17 0853  •  morphine (pf) 4 mg/ml injection 1 mg, 1 mg, Intravenous, Q6HRS PRN, Brittany Che D.O.  •  phenylephrine (NEOSYNEPHRINE) 1 % nasal spray 2 Spray, 2 Spray,  Nasal, TID PRN, Jesi Jose M.D., 2 [place] at 12/27/17 2314  •  vitamin D (cholecalciferol) tablet 2,000 Units, 2,000 Units, Oral, DAILY, Navjot Castellanos M.D., 2,000 Units at 12/29/17 1015  •  tramadol (ULTRAM) 50 MG tablet 50 mg, 50 mg, Oral, Q6HRS PRN, Navjot Castellanos M.D., 50 mg at 12/29/17 1148  •  fish oil capsule 1,000 mg, 1,000 mg, Oral, DAILY, Navjot Castellanos M.D., 1,000 mg at 12/29/17 1015  •  metoprolol SR (TOPROL XL) tablet 100 mg, 100 mg, Oral, DAILY, Brittany Che D.CAMILLA, 100 mg at 12/29/17 1015  •  benzonatate (TESSALON) capsule 100 mg, 100 mg, Oral, TID PRN, Navjot Castellanos M.D.  •  Respiratory Care per Protocol, , Nebulization, Continuous RT, Navjot Castellanos M.D.  •  senna-docusate (PERICOLACE or SENOKOT S) 8.6-50 MG per tablet 2 Tab, 2 Tab, Oral, BID, Stopped at 12/28/17 2100 **AND** polyethylene glycol/lytes (MIRALAX) PACKET 1 Packet, 1 Packet, Oral, QDAY PRN **AND** magnesium hydroxide (MILK OF MAGNESIA) suspension 30 mL, 30 mL, Oral, QDAY PRN **AND** bisacodyl (DULCOLAX) suppository 10 mg, 10 mg, Rectal, QDAY PRN, Navjot Castellanos M.D.  •  heparin injection 5,000 Units, 5,000 Units, Subcutaneous, Q8HRS, Navjot Castellanos M.D., 5,000 Units at 12/29/17 0527  •  acetaminophen (TYLENOL) tablet 650 mg, 650 mg, Oral, Q6HRS PRN, Navjot Castellanos M.D., 650 mg at 12/28/17 0811  •  ondansetron (ZOFRAN) syringe/vial injection 4 mg, 4 mg, Intravenous, Q4HRS PRN, Navjot Castellanos M.D., 4 mg at 12/28/17 0554  •  ondansetron (ZOFRAN ODT) dispertab 4 mg, 4 mg, Oral, Q4HRS PRN, Navjot Castellanos M.D.  •  albuterol inhaler 2 Puff, 2 Puff, Inhalation, Q4HRS PRN, Brittany Frank M.D., 2 Puff at 12/28/17 1725    Current Outpatient Medications:  Prescriptions Prior to Admission   Medication Sig Dispense Refill Last Dose   • diltiazem CD (CARDIZEM CD) 180 MG CAPSULE SR 24 HR Take 180 mg by mouth every day.   12/25/2017 at 0900   • Tiotropium Bromide Monohydrate (SPIRIVA RESPIMAT) 2.5 MCG/ACT Aero Soln Inhale 1 Puff by mouth every day.    "12/25/2017 at 1000   • tramadol (ULTRAM) 50 MG Tab Take 1 Tab by mouth every 6 hours as needed for Moderate Pain. 120 Tab 3 12/24/2017 at 2130   • benzonatate (TESSALON) 100 MG Cap Take 1 Cap by mouth 3 times a day as needed for Cough. 120 Cap 2 12/25/2017 at 1030   • potassium chloride SA (KDUR) 20 MEQ Tab CR Take 1 Tab by mouth every day. 90 Tab 0 12/25/2017 at 0900   • furosemide (LASIX) 80 MG Tab Take 1 Tab by mouth every day. 90 Tab 3 12/25/2017 at 0900   • ipratropium-albuterol (DUONEB) 0.5-2.5 (3) MG/3ML nebulizer solution 3 mL by Nebulization route 4 times a day. 120 Vial 6 12/25/2017 at 1030   • metoprolol SR (TOPROL XL) 50 MG TABLET SR 24 HR Take 1 Tab by mouth every day. 90 Tab 3 12/25/2017 at 0900   • Mometasone Furo-Formoterol Fum (DULERA) 200-5 MCG/ACT Aerosol Inhale 2 Puffs by mouth 2 Times a Day. Use spacer. Rinse mouth after use. 3 Inhaler 3 12/25/2017 at 0900   • PROAIR  (90 BASE) MCG/ACT Aero Soln inhalation aerosol INHALE 2 PUFFS BY MOUTH EVERY 4 HOURS AS NEEDED FOR SHORTNESS OF BREATH 1 Inhaler 5 12/25/2017 at 1000   • Omega-3 Fatty Acids (FISH OIL) 1000 MG Cap capsule Take 1,000 mg by mouth every day.   12/25/2017 at 0900   • vitamin D (CHOLECALCIFEROL) 1000 UNIT TABS Take 2,000 Units by mouth every day.   12/25/2017 at 0900       Medication Allergy:  Allergies   Allergen Reactions   • Angiotensin Receptor Blockers    • Atorvastatin Myalgia   • Codeine    • Niacin Itching   • Pradaxa      Severe bleeding tendency   • Red Yeast Rice [Misc Terri Hmg Coa Reduct Inhib] Myalgia   • Rosuvastatin Myalgia   • Statins [Hmg-Coa-R Inhibitors]    • Hydrocodone Unspecified     Headache. No reaction to Tylenol.             Objective:   Vitals/ General Appearance:   Weight/BMI: Body mass index is 54.66 kg/m².  Blood pressure 100/58, pulse (!) 110, temperature 36.3 °C (97.3 °F), resp. rate 20, height 1.702 m (5' 7\"), weight (!) 158.3 kg (348 lb 15.8 oz), SpO2 93 %, not currently breastfeeding.  Vitals:    " 12/29/17 0800 12/29/17 0858 12/29/17 1014 12/29/17 1106   BP: (!) 93/58 103/60 100/58    Pulse: 92   (!) 110   Resp: 20   20   Temp: 36.3 °C (97.3 °F)      SpO2: 92%   93%   Weight:       Height:         Oxygen Therapy:  Pulse Oximetry: 93 %, O2 (LPM): 60, O2 Delivery: High Flow Nasal Cannula    Constitutional:   Well developed, Well nourished, No acute distress;On high flow oxygen and able to converse  HENMT:  Normocephalic, Atraumatic, Oropharynx moist mucous membranes, No oral exudates, Nose normal.  No thyromegaly.  Eyes:  EOMI, Conjunctiva normal, No discharge.  Neck:  Normal range of motion, No cervical tenderness,  no JVD.  Cardiovascular:  Normal heart rate, Normal rhythm, No murmurs, No rubs, No gallops.   Extremitites with intact distal pulses, no cyanosis, or edema.  Lungs:  bilateral rales, mild tachypnea but no accessory muscle use or distress    Abdomen: Bowel sounds normal, Soft, No tenderness, No guarding, No rebound, No masses, No hepatosplenomegaly.  Skin: Warm, Dry, No erythema, No rash, no induration.  Neurologic: Alert & oriented x 3, No focal deficits noted, cranial nerves II through X are grossly intact.  Psychiatric: Affect normal, Judgment normal, Mood normal.    Labs:  Recent Labs      12/28/17   0912   WBC  6.3   RBC  4.19*   HEMOGLOBIN  12.5   HEMATOCRIT  39.5   MCV  94.3   MCH  29.8   MCHC  31.6*   RDW  59.2*   PLATELETCT  233   MPV  11.2         Recent Labs      12/28/17   0912   BNPBTYPENAT  2105*     Recent Labs      12/27/17   2140  12/28/17   0526  12/28/17   0912   TROPONINI  0.03  0.03  0.03   BNPBTYPENAT   --    --   2105*     Recent Labs      12/28/17   0912  12/29/17   0400  12/29/17   0628  12/29/17   1029   SODIUM  139  138   --   138   POTASSIUM  5.4  6.7*  6.6*  6.3*   CHLORIDE  102  99   --   100   CO2  33  31   --   29   GLUCOSE  125*  117*   --   178*   BUN  36*  44*   --   48*     Recent Labs      12/28/17   0912  12/29/17   0400  12/29/17   0628  12/29/17   1029    SODIUM  139  138   --   138   POTASSIUM  5.4  6.7*  6.6*  6.3*   CHLORIDE  102  99   --   100   CO2  33  31   --   29   BUN  36*  44*   --   48*   CREATININE  1.45*  1.91*   --   2.07*   CALCIUM  9.5  10.1   --   10.1     Results for orders placed or performed during the hospital encounter of 11/25/16   Echocardiogram Comp W/O Cont   Result Value Ref Range    Left Ventrical Ejection Fraction 60          Imaging:   DX-CHEST-PORTABLE (1 VIEW)   Final Result      Moderate/marked pulmonary vascular congestion and edema similar to previous findings.      DX-CHEST-PORTABLE (1 VIEW)   Final Result      Increased pulmonary vascular congestion and edema.      ECHOCARDIOGRAM-COMP W/ CONT   Final Result      DX-CHEST-PORTABLE (1 VIEW)   Final Result      1.  Stable cardiomegaly      2.  Pulmonary vascular prominence with increased perihilar interstitial markings. Findings are suggestive of pulmonary edema. Atypical infection could have a similar appearance.      CT-CTA CHEST PULMONARY ARTERY W/ RECONS    (Results Pending)   LE VENOUS DUPLEX - DVT ([facility] Only)    (Results Pending)           Assessment and Plan:  Active Problems:    Hyperkalemia POA: Yes    ARF (acute renal failure) (CMS-HCC) POA: Yes    Chronic atrial fibrillation (HCC) (Chronic) POA: Yes    Morbid obesity with BMI of 60.0-69.9, adult ([facility]) POA: Yes    Hypertension (Chronic) POA: Yes    COPD with acute exacerbation (CMS-HCC) POA: Unknown    Diastolic CHF (CMS-HCC) POA: Unknown    Acute hypoxemic respiratory failure (CMS-HCC) POA: Unknown  Resolved Problems:    * No resolved hospital problems. *    Acute respiratory failure is multifactorial with pulmonary edema, underlying COPD, acute and chronic CO2 retention    Discussed with hospitalist and nephrologist, continue high flow oxygen, nighttime BiPAP, DO NOT RESUSCITATE status, but continue treatment at present as described including diuresis, steroids, breathing treatments and follow

## 2017-12-29 NOTE — PROGRESS NOTES
Pt refusing insulin, dextrose, and kayexalate until potassium results are back and a MD is at bedside to explain the medications.

## 2017-12-29 NOTE — PROGRESS NOTES
Updated Dr. Che on pt refusing medications and K+ level; no new orders; MD will speak with pt during rounds.

## 2017-12-29 NOTE — PROGRESS NOTES
Pt removed high flow n/c and was found with O2 sat in the mid to low 60's at 0515. RT notified. Pt placed back on high flow at 55L, 80% O2. O2 sat back up to 90%. RT now at bedside.  Will continue to monitor.

## 2017-12-29 NOTE — PROGRESS NOTES
Chelsea Ojeda Fall Risk Assessment:     Last Known Fall: No falls  Mobility: Use of assistive device/requires assist of two people  Medications: Cardiovascular or central nervous system meds, Diuretics  Mental Status/LOC/Awareness: Awake, alert, and oriented to date, place, and person  Toileting Needs: Use of assistive device (Bedside commode, bedpan, urinal), Diarrhea/frequency/urgency  Volume/Electrolyte Status: Low blood sugar/electrolyte imbalances  Communication/Sensory: Visual (Glasses)/hearing deficit  Behavior: Depression/anxiety  Chelsea Ojeda Fall Risk Total: 22  Fall Risk Level: HIGH RISK    Universal Fall Precautions:  call light/belongings in reach, bed in low position and locked, wheelchairs and assistive devices out of sight, siderails up x 2, adequate lighting, use non-slip footwear, clutter free and spill free environment, educate on level of risk, educate to call for assistance    Fall Risk Level Interventions:    TRIAL (TELE 8, NEURO, MED MAAME 5) Moderate Fall Risk Interventions  Place yellow fall risk ID band on patient: verified  Provide patient/family education based on risk assessment : completed  Educate patient/family to call staff for assistance when getting out of bed: completed  Place fall precaution signage outside patient door: verified  Utilize bed/chair fall alarm: verifiedTRIAL (TELE 8, NEURO, MED MAAME 5) High Fall Risk Interventions  Place yellow fall risk ID band on patient: verified  Provide patient/family education based on risk assessment: completed  Educate patient/family to call staff for assistance when getting out of bed: completed  Place fall precaution signage outside patient door: verified  Place patient in room close to nursing station: verified  Utilize bed/chair fall alarm: verified  Notify charge of high risk for huddle: completed    Patient Specific Interventions:     Medication: review medications with patient and family  Mental Status/LOC/Awareness: reinforce  falls education, check on patient hourly, utilize bed/chair fall alarm and reinforce the use of call light  Toileting: provide frquent toileting and monitor intake and output/use of appropriate interventions  Volume/Electrolyte Status: ensure patient remains hydrated and monitor abnormal lab values  Communication/Sensory: update plan of care on whiteboard  Behavioral: encourage patient to voice feelings, administer medication as ordered and instruct/reinforce fall program rationale  Mobility: utilize bed/chair fall alarm, ensure bed is locked and in lowest position and provide appropriate assistive device

## 2017-12-29 NOTE — PROGRESS NOTES
Paged by nurse for a potassium of 6.7, creatinine 1.91. Stat EKG, calcium gluconate, and medical treatment for potassium ordered. Stat repeat potassium ordered as well. Unclear if this is a true hyperkalemia, called nurse back at 6:55 AM and repeat potassium is at this point still pending. I did ask the nurse to call hospitalist who will be covering after 7 as soon as possible if the results come back after 7.

## 2017-12-29 NOTE — THERAPY
"Speech Language Therapy Clinical Swallow Evaluation completed.  Functional Status: Patient seen for swallowing evaluation this morning. Patient awake, alert and oriented in all spheres.  She is currently on 60L O2 with 90% FiO2 via HFNC.  Oral mechanism evaluation was grossly intact.  Presentation of PO consisted of ice, nectars, thin liquids, purees, soft solids and solids. Patient was noted to be impulsive with rate and bite size during self feeding. Patient had inconsistent throat clearing and subtle voice changes following thin liquid swallows which can be concerning for possible penetration/aspiration.  On soft solids and solids, she had increased WOB during mastication leading to delayed onset of swallow. She had throat clearing during mastication and following swallow on mixed consistencies.  At this time, would recommend initiation of dysphagia II diet with NTL (low Na++) with close monitoring for any S/Sx of aspiration.  Patient remains at risk for aspiration given her high O2 demands and decreased ability to coordinate swallow/respiratory systems.  SLP will follow for diet upgrade as possible.    Recommendations - Diet:  Dysphagia II diet with nectar thick liquids (low Na++)                          Strategies: Monitor during meals, No Straws, slow rate, small bites and sips and Head of Bed at 90 Degrees--please assist with tray set up.                          Medication Administration:    Plan of Care: Will benefit from Speech Therapy 3 times per week  Post-Acute Therapy: Discharge to a transitional care facility for continued skilled therapy services.    See \"Rehab Therapy-Acute\" Patient Summary Report for complete documentation.           "

## 2017-12-29 NOTE — PROGRESS NOTES
Critical lab results from Jude; K+ at 6.6. Critical lab results read back to Jude. Paged Dr. Che for results and pt refusing medications until MD at bedside to explain insulin, dextrose, and kayexalate. Educated pt on why she needs these medications and of her K+ results; pt still refusing. Waiting for return call.

## 2017-12-29 NOTE — PROGRESS NOTES
Assumed care of patient at bedside report from day shift RN. Updated on POC. Patient currently sleeping; on 50L/80% O2 high flow nasal cannula; and without any complaints of acute pain. Call light within reach. Fall precautions in place. Bed locked and in lowest position. No needs indicated at this time.

## 2017-12-29 NOTE — CARE PLAN
Problem: Safety  Goal: Will remain free from falls  Outcome: PROGRESSING AS EXPECTED  Chelsea Ojeda Fall Risk Assessment:     Last Known Fall: No falls  Mobility: Use of assistive device/requires assist of two people  Medications: Cardiovascular or central nervous system meds  Mental Status/LOC/Awareness: Awake, alert, and oriented to date, place, and person  Toileting Needs: Use of assistive device (Bedside commode, bedpan, urinal), Diarrhea/frequency/urgency  Volume/Electrolyte Status: No problems  Communication/Sensory: Visual (Glasses)/hearing deficit  Behavior: Depression/anxiety, Appropriate behavior  Chelsea Ojeda Fall Risk Total: 15  Fall Risk Level: HIGH RISK    Universal Fall Precautions:  call light/belongings in reach, bed in low position and locked, wheelchairs and assistive devices out of sight, siderails up x 2, adequate lighting, clutter free and spill free environment, educate on level of risk, educate to call for assistance    Fall Risk Level Interventions:    TRIAL (TELE 8, NEURO, MED MAAME 5) Moderate Fall Risk Interventions  Place yellow fall risk ID band on patient: verified  Provide patient/family education based on risk assessment : completed  Educate patient/family to call staff for assistance when getting out of bed: completed  Place fall precaution signage outside patient door: verified  Utilize bed/chair fall alarm: verifiedTRIAL (TELE 8, NEURO, MED MAAME 5) High Fall Risk Interventions  Place yellow fall risk ID band on patient: verified  Provide patient/family education based on risk assessment: completed  Educate patient/family to call staff for assistance when getting out of bed: completed  Place fall precaution signage outside patient door: verified  Place patient in room close to nursing station: verified  Utilize bed/chair fall alarm: completed  Notify charge of high risk for huddle: completed    Patient Specific Interventions:     Medication: review medications with patient and  family, assess for medications that can be discontinued or dosage decreased, limit combination of prn medications and assess need for orthostatic hypotension evaluation  Mental Status/LOC/Awareness: not applicable  Toileting: provide frquent toileting and instruct patient/family on the use of grab bars  Volume/Electrolyte Status: administer medications as ordered for nausea and vomiting, monitor abnormal lab values and teach patients to dangle before rising if hypotensive  Communication/Sensory: update plan of care on whiteboard, ensure proper positioning when transferrng/ambulating and ensure patient has glasses/contacts and hearing aids/dentures  Behavioral: encourage patient to voice feelings and engage patient in daily activities  Mobility: schedule physical activity throughout the day, provide comfort measures during transport, dangle prior to standing, utilize bed/chair fall alarm, ensure bed is locked and in lowest position, provide appropriate assistive device, instruct patient to exit bed on their strongest side and collaborate with doctor for possible PT/OT consult    Problem: Knowledge Deficit  Goal: Knowledge of the prescribed therapeutic regimen will improve  Outcome: PROGRESSING SLOWER THAN EXPECTED  Discussed evening medications with patient. Patient was highly fatigued but verbally acknowledged education. Reinforcement may be necessary.

## 2017-12-29 NOTE — CARE PLAN
Problem: Oxygenation:  Goal: Maintain adequate oxygenation dependent on patient condition  Outcome: PROGRESSING AS EXPECTED    Intervention: Manage oxygen therapy by monitoring pulse oximetry and/or ABG values  Pt on HFNC 60L and 90% FIO2      Problem: Bronchoconstriction:  Goal: Improve in air movement and diminished wheezing  Outcome: PROGRESSING AS EXPECTED    Intervention: Implement inhaled treatments  Xopenex QID

## 2017-12-29 NOTE — CARE PLAN
Problem: Nutritional:  Goal: Patient to verbalize or demonstrate understanding of diet  Outcome: MET Date Met: 12/29/17  Provided pt with cardiac diet education. Pt receptive.

## 2017-12-29 NOTE — HEART FAILURE PROGRAM
"Cardiovascular Nurse Navigator () Progress Note:     Please note this is an acutely decompensated HFpEF pt. As of 12/29 diuresis is ongoing, Cardiology not following inpatient but she did see Dr. Mukherjee on 7/12/16.      Pt is currently hyperkalemic and with nephrology consult has opted to not start dialysis and revisit hospice.    [facility] Plan Notes:   Pertaining to SNF declination.   Therapy Notes:   SLP: [facility]; PT: couldn't participate; OT couldn't participate  Insurance Coverage:  Medicare  Patient Residence:  [place]  Follow up appointment:   Pt must have an appointment scheduled within 7 days of discharge (Cardiology, PCP, or DC Clinic) UNLESS she discharges to SNF or to home with hospice.    She has an appt with Dr. Mukherjee on Jan 30. This should be cancelled if pt discharges on Hospice.    Education:  IF PATIENT DECLINES HOSPICE: Bedside nursing to please provide patient with HF packet and begin teaching and documenting in education tab, each shift should teach sections until all are covered.     When completing the after visit summary (discharge instructions) please select \"Cardiac Diagnosis, and Heart Failure\" in the special instructions section to populate the heart failure specific discharge instructions.     Referrals Placed:    Registered Dietician: will refer only if patient declines hospice.  To provide education on HF diet.    Thank you and please call ABDOULAYE Dennison with any questions: [phone].   "

## 2017-12-29 NOTE — PROGRESS NOTES
Corie from Lab called with critical result of potassium of 6.7 at 0515. Critical lab result read back to Corie.   Dr. Jose notified of critical lab result at 0549.  Critical lab result read back by Dr. Jose.

## 2017-12-29 NOTE — PROGRESS NOTES
Bedside report received. POC discussed with pt; all questions answered at this time. Pt A&0x4. Pt with critical K+ level and refusing ordered medications. Will update MD and continue to monitor pt condition.

## 2017-12-30 NOTE — PROGRESS NOTES
Bedside report received. Pt sleeping in bed; no signs of acute distress or discomfort. Call light within reach.

## 2017-12-30 NOTE — PROGRESS NOTES
[facility] Hospitalist Progress Note    Date of Service: 2017    Chief Complaint  72 y.o. female admitted 2017 with morbid obesity, COPD with chronic hypoxia requiring 8-11 L home oxygen on hospice, admitted for acute on chronic diastolic heart failure.    Interval Problem Update  Increasing sob and pain  With increasing RF and hyperkalemia    poa aware, transition to comfort care      Consultants/Specialty  None    Disposition  Comfort care/dnr  Inpatient hospice consulted      Review of Systems   Unable to perform ROS: Acuity of condition      Physical Exam  Laboratory/Imaging   Hemodynamics  Temp (24hrs), Av.8 °C (98.2 °F), Min:36.7 °C (98 °F), Max:37 °C (98.6 °F)   Temperature: 36.8 °C (98.2 °F)  Pulse  Av.1  Min: 50  Max: 126    Blood Pressure : 116/74      Respiratory      Respiration: 18, Pulse Oximetry: 89 %, O2 Daily Delivery Respiratory : Highflow Nasal Cannula     Given By::  (aerogen), Work Of Breathing / Effort: Mild  RUL Breath Sounds: Diminished, RML Breath Sounds: Diminished, RLL Breath Sounds: Diminished, YODIT Breath Sounds: Diminished, LLL Breath Sounds: Diminished    Fluids    Intake/Output Summary (Last 24 hours) at 17 1106  Last data filed at 17 0845   Gross per 24 hour   Intake              120 ml   Output              200 ml   Net              -80 ml       Nutrition  Orders Placed This Encounter   Procedures   • DIET ORDER     Standing Status:   Standing     Number of Occurrences:   1     Order Specific Question:   Diet:     Answer:   2 Gram Sodium [7]     Order Specific Question:   Texture/Fiber modifications:     Answer:   Dysphagia 2(Pureed/Chopped)specify fluid consistency(question 6) [2]     Order Specific Question:   Consistency/Fluid modifications:     Answer:   Nectar Thick [2]     Physical Exam   Constitutional: She appears well-nourished. She appears distressed.   Morbid obesity   HENT:   Head: Normocephalic and atraumatic.   Eyes: Pupils are equal, round,  and reactive to light. Right eye exhibits no discharge. Left eye exhibits no discharge.   Neck: Normal range of motion. No thyromegaly present.   Cardiovascular: Normal rate and intact distal pulses.    Murmur heard.  Pulmonary/Chest: She is in respiratory distress. She has no wheezes. She has rales.   Decreased b/l   Abdominal: Soft. She exhibits no distension.   Musculoskeletal: She exhibits edema. She exhibits no tenderness.   Neurological: No cranial nerve deficit.   Lethargic, arousable   Skin: Skin is warm and dry. She is not diaphoretic.   Psychiatric: Thought content normal.   Nursing note and vitals reviewed.      Recent Labs      12/28/17   0912  12/30/17   0357   WBC  6.3  4.2*   RBC  4.19*  4.80   HEMOGLOBIN  12.5  14.3   HEMATOCRIT  39.5  46.3   MCV  94.3  96.5   MCH  29.8  29.8   MCHC  31.6*  30.9*   RDW  59.2*  63.3*   PLATELETCT  233  254   MPV  11.2  11.2     Recent Labs      12/29/17   0400  12/29/17   0628  12/29/17   1029  12/30/17   0357   SODIUM  138   --   138  136   POTASSIUM  6.7*  6.6*  6.3*  6.5*   CHLORIDE  99   --   100  99   CO2  31   --   29  29   GLUCOSE  117*   --   178*  168*   BUN  44*   --   48*  59*   CREATININE  1.91*   --   2.07*  2.33*   CALCIUM  10.1   --   10.1  10.1         Recent Labs      12/28/17   0912   BNPBTYPENAT  2105*              Assessment/Plan     Chronic atrial fibrillation (HCC)- (present on admission)   Assessment & Plan    Rate control          ARF (acute renal failure) (CMS-[facility])- (present on admission)   Assessment & Plan    As above        Hyperkalemia- (present on admission)   Assessment & Plan    As above        Chronic kidney disease (CKD), stage III (moderate)   Assessment & Plan    At baseline  Monitor        Acute hypoxemic respiratory failure (CMS-[facility])   Assessment & Plan    Increasing sob  Now requesting initiation of Comfort care measure, patient states that she wants to go now, d/w her POA, agree with proceeding with comfort care/dnr  Dc  bipap  Morphine/ativan prn  O2 for comfort prn    MSOF- ARF, hyperkalemia, respiratory failure        Diastolic CHF (CMS-HCC)   Assessment & Plan    Acute on chronic    Echo 11/2016, ef 60%  Repeat echo on this admission with ejection fraction of 55%    As above            COPD with acute exacerbation (CMS-HCC)   Assessment & Plan    With exacerbation  Now off steroids  Protocol not used as patient is now comfort care        Hypertension- (present on admission)   Assessment & Plan    Controlled  Essential          Morbid obesity with BMI of 60.0-69.9, adult (CMS-HCC)- (present on admission)   Assessment & Plan    Patient requesting additional snacks  Dietary consult for diabetic education and calorie count  Advised her to exercise            Reviewed items::  Labs reviewed, Medications reviewed and Radiology images reviewed  DVT prophylaxis pharmacological::  Enoxaparin (Lovenox)  Ulcer Prophylaxis::  Not indicated

## 2017-12-30 NOTE — DISCHARGE PLANNING
Medical Social Work    Referral: To find who the pt's POA.      Intervention: This writer called pt's sister Joselin who reports no one can find the pt's power of  paperwork. Per Dianne her sister told her that her friend Tricia [phone]  Is her POA and her youngest son Jourdan who is in alf in [place]. Pt also has a son Eliza who is a . Joselin has no contact information on an family.    Plan:  This writer left a voicemail for Tricia to discuss if she has any contact information for pt's sons.

## 2017-12-30 NOTE — CONSULTS
Pulmonary Progress note,12/30/17      Patient ID:   Name:             Michela Humphries   YOB: 1945  Age:                 72 y.o.  female   MRN:               [id number]                                                  Reason for Consult:  Respiratory failure with pulmonary edema and acute renal failure    Requesting physician:  Dr. Brittany Che    History of Present Illness:  This lady was being considered for possible BiPAP or even intubation with severe respiratory failure, pulmonary edema, acute CO2 retention, with underlying COPD as well as sleep apnea, morbid obesity and now complicating pulmonary edema. We discussed transfer to ICU, but with acute renal failure, nephrology indicated that intervention or dialysis would not be appropriate, and after discussion and conference with the patient, decided against utilizing either dialysis, intensive care unit administration's, intubation or BiPAP.  She is remarkably coherent, has lived in [place] most of her life, grew up in [place]. She worked at [employer] as a . We have a number of mutual friends and acquaintances. She confirms a desire not to be on life support, and our goals will be to provide oxygen at high flow, nighttime BiPAP for sleep apnea predominantly, diuresis if possible and nephrology is following closely.    The review of systems from a pulmonary standpoint does not include fever chills or sweats she is not coughing blood or mucopurulence there is no signs of acute coronary syndrome or cardiac chest pain that might be contributing. GI and  history are noted. Neuropsychiatric does not include new weakness, she is coherent and interactive    Today the patient is requesting morphine and comfort care, still awake and coherent, family and friends at bedside. Confirm no interventions, pulmonary will sign off that she appears comfortable on high flow oxygen and BiPAP with narcotic administration  ROS:  Complete ROS  was done and was negative except what mentioned in HPI     Past Medical History:  Past Medical History:   Diagnosis Date   • Anesthesia     ponv, headache   • Aspirin long-term use     low dose, per Dr. Butler   • Atrial flutter (CMS-[facility])    • Chronic bronchitis (CMS-[facility])    • CKD (chronic kidney disease) stage 3, GFR 30-59 ml/min    • Colon adenomas    • Congestive heart failure (CMS-[facility])    • COPD with asthma (CMS-[facility])    • Diastolic heart failure    • Echocardiogram abnormal     10/22/11 EF 6-65%, 2plus MR, 1 plus TR, Normal pulmonary  pressures.   • Essential hypertension, benign    • Hypoxemia    • Morbid obesity (CMS-[facility])    • Primary pulmonary hypertension (CMS-[facility])    • Snoring    • Unspecified cataract    • Unspecified sleep apnea     uses Bipap       Past surgical history:  Past Surgical History:   Procedure Laterality Date   • RECOVERY  6/16/2016    Procedure: CATH LAB, [facility], ;  Surgeon: Recovery[facility] Surgery;  [place] [facility]  Service:    • COLONOSCOPY  3/30/2016    Procedure: COLONOSCOPY WITH BIOPSY;  Surgeon: Daniel Eli M.D.;  Location: SURGERY [facility];  Service:    • RECOVERY  1/16/2012    Performed by SURGERY, CATH-RECOVERY at SURGERY SAME DAY [facility] ORS   • KNEE ARTHROSCOPY     • SHOULDER SURGERY         Family History:  Family History   Problem Relation Age of Onset   • Heart Disease Neg Hx    • Other Mother      brain aneurysm/trauma   • Dementia Sister      early   • Hypertension Sister    • Hyperlipidemia Sister    • Cancer Brother      [facility] Medications:    Current Facility-Administered Medications:   •  methylPREDNISolone sod succ (SOLU-MEDROL) 125 MG injection 125 mg, 125 mg, Intravenous, Q12HRS, Brittany Che D.O.  •  MD ALERT...adult comfort care, , Other, PRN, Brittany Che D.O.  •  atropine 1 % ophthalmic solution 2 Drop, 2 Drop, Sublingual, Q4HRS PRN, Brittany Che D.O.  •  morphine (ROXANOL) 20 MG/ML oral conc 10 mg, 10  mg, Oral, Q HOUR PRN **OR** morphine (pf) 10 mg/ml 10 MG/ML injection 5 mg, 5 mg, Intravenous, Q HOUR PRN, 5 mg at 12/30/17 1130 **OR** morphine (pf) 10 mg/ml 10 MG/ML injection 10 mg, 10 mg, Intravenous, Q HOUR PRN, CHELLE Batista.O.  •  ondansetron (ZOFRAN ODT) dispertab 8 mg, 8 mg, Oral, Q8HRS PRN **OR** ondansetron (ZOFRAN) syringe/vial injection 8 mg, 8 mg, Intravenous, Q8HRS PRN, CHELLE Batista.O., 8 mg at 12/30/17 1131  •  lorazepam (ATIVAN) 2 MG/ML oral conc 1 mg, 1 mg, Sublingual, Q HOUR PRN **OR** lorazepam (ATIVAN) injection 1 mg, 1 mg, Intravenous, Q HOUR PRN, CHELLE Batista.O., 1 mg at 12/30/17 1131  •  artificial tears 1.4 % ophthalmic solution 2 Drop, 2 Drop, Both Eyes, Q6HRS PRN, CHELLE Batista.O.  •  levalbuterol (XOPENEX) 1.25 MG/3ML nebulizer solution 1.25 mg, 1.25 mg, Nebulization, 4X/DAY (RT), CHELLE Batista.O., 1.25 mg at 12/29/17 1919  •  furosemide (LASIX) injection 80 mg, 80 mg, Intravenous, Q DAY, Brittany Che D.O., 80 mg at 12/29/17 0853  •  morphine (pf) 4 mg/ml injection 1 mg, 1 mg, Intravenous, Q6HRS PRN, CHELLE Batista.O., 1 mg at 12/30/17 0913  •  phenylephrine (NEOSYNEPHRINE) 1 % nasal spray 2 Spray, 2 Spray, Nasal, TID PRN, Jesi Jose M.D., 2 [place] at 12/27/17 231  •  vitamin D (cholecalciferol) tablet 2,000 Units, 2,000 Units, Oral, DAILY, Navjot Castellanos M.D., 2,000 Units at 12/29/17 1015  •  tramadol (ULTRAM) 50 MG tablet 50 mg, 50 mg, Oral, Q6HRS PRN, Navjot Castellanos M.D., 50 mg at 12/29/17 1148  •  fish oil capsule 1,000 mg, 1,000 mg, Oral, DAILY, Navjot Castellanos M.D., 1,000 mg at 12/29/17 1015  •  metoprolol SR (TOPROL XL) tablet 100 mg, 100 mg, Oral, DAILY, Brittany Che DALEJANDRINA, 100 mg at 12/29/17 1015  •  benzonatate (TESSALON) capsule 100 mg, 100 mg, Oral, TID PRN, Navjot Castellanos M.D.  •  Respiratory Care per Protocol, , Nebulization, Continuous RT, Navjot Castellanos M.D.  •  senna-docusate (PERICOLACE or SENOKOT S) 8.6-50 MG per tablet 2 Tab, 2 Tab, Oral, BID, Stopped at  12/28/17 2100 **AND** polyethylene glycol/lytes (MIRALAX) PACKET 1 Packet, 1 Packet, Oral, QDAY PRN **AND** magnesium hydroxide (MILK OF MAGNESIA) suspension 30 mL, 30 mL, Oral, QDAY PRN **AND** bisacodyl (DULCOLAX) suppository 10 mg, 10 mg, Rectal, QDAY PRN, Navjot Castellanos M.D.  •  heparin injection 5,000 Units, 5,000 Units, Subcutaneous, Q8HRS, Navjot Castellanos M.D., 5,000 Units at 12/29/17 1400  •  acetaminophen (TYLENOL) tablet 650 mg, 650 mg, Oral, Q6HRS PRN, Navjot Castellanos M.D., 650 mg at 12/28/17 0811  •  ondansetron (ZOFRAN) syringe/vial injection 4 mg, 4 mg, Intravenous, Q4HRS PRN, Navjot Castellanos M.D., 4 mg at 12/28/17 0554  •  ondansetron (ZOFRAN ODT) dispertab 4 mg, 4 mg, Oral, Q4HRS PRN, Navjot Castellanos M.D.  •  albuterol inhaler 2 Puff, 2 Puff, Inhalation, Q4HRS PRN, Brittany Frank M.D., 2 Puff at 12/28/17 1725    Current Outpatient Medications:  Prescriptions Prior to Admission   Medication Sig Dispense Refill Last Dose   • diltiazem CD (CARDIZEM CD) 180 MG CAPSULE SR 24 HR Take 180 mg by mouth every day.   12/25/2017 at 0900   • Tiotropium Bromide Monohydrate (SPIRIVA RESPIMAT) 2.5 MCG/ACT Aero Soln Inhale 1 Puff by mouth every day.   12/25/2017 at 1000   • tramadol (ULTRAM) 50 MG Tab Take 1 Tab by mouth every 6 hours as needed for Moderate Pain. 120 Tab 3 12/24/2017 at 2130   • benzonatate (TESSALON) 100 MG Cap Take 1 Cap by mouth 3 times a day as needed for Cough. 120 Cap 2 12/25/2017 at 1030   • potassium chloride SA (KDUR) 20 MEQ Tab CR Take 1 Tab by mouth every day. 90 Tab 0 12/25/2017 at 0900   • furosemide (LASIX) 80 MG Tab Take 1 Tab by mouth every day. 90 Tab 3 12/25/2017 at 0900   • ipratropium-albuterol (DUONEB) 0.5-2.5 (3) MG/3ML nebulizer solution 3 mL by Nebulization route 4 times a day. 120 Vial 6 12/25/2017 at 1030   • metoprolol SR (TOPROL XL) 50 MG TABLET SR 24 HR Take 1 Tab by mouth every day. 90 Tab 3 12/25/2017 at 0900   • Mometasone Furo-Formoterol Fum (DULERA) 200-5 MCG/ACT Aerosol  "Inhale 2 Puffs by mouth 2 Times a Day. Use spacer. Rinse mouth after use. 3 Inhaler 3 12/25/2017 at 0900   • PROAIR  (90 BASE) MCG/ACT Aero Soln inhalation aerosol INHALE 2 PUFFS BY MOUTH EVERY 4 HOURS AS NEEDED FOR SHORTNESS OF BREATH 1 Inhaler 5 12/25/2017 at 1000   • Omega-3 Fatty Acids (FISH OIL) 1000 MG Cap capsule Take 1,000 mg by mouth every day.   12/25/2017 at 0900   • vitamin D (CHOLECALCIFEROL) 1000 UNIT TABS Take 2,000 Units by mouth every day.   12/25/2017 at 0900       Medication Allergy:  Allergies   Allergen Reactions   • Angiotensin Receptor Blockers    • Atorvastatin Myalgia   • Codeine    • Niacin Itching   • Pradaxa      Severe bleeding tendency   • Red Yeast Rice [Misc Terri Hmg Coa Reduct Inhib] Myalgia   • Rosuvastatin Myalgia   • Statins [Hmg-Coa-R Inhibitors]    • Hydrocodone Unspecified     Headache. No reaction to Tylenol.             Objective:   Vitals/ General Appearance:   Weight/BMI: Body mass index is 55.59 kg/m².  Blood pressure 116/74, pulse 85, temperature 36.8 °C (98.2 °F), resp. rate 18, height 1.702 m (5' 7\"), weight (!) 161 kg (354 lb 15.1 oz), SpO2 89 %, not currently breastfeeding.  Vitals:    12/30/17 0247 12/30/17 0314 12/30/17 0804 12/30/17 1110   BP:  119/79 116/74    Pulse: 98 91 83 85   Resp: 18 20 18 18   Temp:  36.8 °C (98.3 °F) 36.8 °C (98.2 °F)    SpO2: 92% 92% 89%    Weight:       Height:         Oxygen Therapy:  Pulse Oximetry: 89 %, O2 (LPM): 60, O2 Delivery: High Flow Nasal Cannula    Constitutional:   Well developed, Well nourished, No acute distress;On high flow oxygen and able to converse;Comfortable with morphine administration  HENMT:  Normocephalic, Atraumatic, Oropharynx moist mucous membranes, No oral exudates, Nose normal.  No thyromegaly.  Eyes:  EOMI, Conjunctiva normal, No discharge.  Neck:  Normal range of motion, No cervical tenderness,  no JVD.  Cardiovascular:  Normal heart rate, Normal rhythm, No murmurs, No rubs, No gallops.   " Extremitites with intact distal pulses, no cyanosis, or edema.  Lungs:  bilateral rales, mild tachypnea but no accessory muscle use or distress    Abdomen: Bowel sounds normal, Soft, No tenderness, No guarding, No rebound, No masses, No hepatosplenomegaly.  Skin: Warm, Dry, No erythema, No rash, no induration.  Neurologic: Alert & oriented x 3, No focal deficits noted, cranial nerves II through X are grossly intact.      Labs:  Recent Labs      12/28/17   0912  12/30/17   0357   WBC  6.3  4.2*   RBC  4.19*  4.80   HEMOGLOBIN  12.5  14.3   HEMATOCRIT  39.5  46.3   MCV  94.3  96.5   MCH  29.8  29.8   MCHC  31.6*  30.9*   RDW  59.2*  63.3*   PLATELETCT  233  254   MPV  11.2  11.2         Recent Labs      12/28/17   0912   BNPBTYPENAT  2105*     Recent Labs      12/27/17   2140  12/28/17   0526  12/28/17   0912   TROPONINI  0.03  0.03  0.03   BNPBTYPENAT   --    --   2105*     Recent Labs      12/29/17   0400  12/29/17   0628  12/29/17   1029  12/30/17   0357   SODIUM  138   --   138  136   POTASSIUM  6.7*  6.6*  6.3*  6.5*   CHLORIDE  99   --   100  99   CO2  31   --   29  29   GLUCOSE  117*   --   178*  168*   BUN  44*   --   48*  59*     Recent Labs      12/29/17   0400  12/29/17   0628  12/29/17   1029  12/30/17   0357   SODIUM  138   --   138  136   POTASSIUM  6.7*  6.6*  6.3*  6.5*   CHLORIDE  99   --   100  99   CO2  31   --   29  29   BUN  44*   --   48*  59*   CREATININE  1.91*   --   2.07*  2.33*   CALCIUM  10.1   --   10.1  10.1     Results for orders placed or performed during the hospital encounter of 11/25/16   Echocardiogram Comp W/O Cont   Result Value Ref Range    Left Ventrical Ejection Fraction 60          Imaging:   LE VENOUS DUPLEX - DVT ([facility] Only)         DX-CHEST-PORTABLE (1 VIEW)   Final Result      Moderate/marked pulmonary vascular congestion and edema similar to previous findings.      DX-CHEST-PORTABLE (1 VIEW)   Final Result      Increased pulmonary vascular congestion  and edema.      ECHOCARDIOGRAM-COMP W/ CONT   Final Result      DX-CHEST-PORTABLE (1 VIEW)   Final Result      1.  Stable cardiomegaly      2.  Pulmonary vascular prominence with increased perihilar interstitial markings. Findings are suggestive of pulmonary edema. Atypical infection could have a similar appearance.              Assessment and Plan:  Active Problems:    Hyperkalemia POA: Yes    ARF (acute renal failure) (CMS-HCC) POA: Yes    Chronic atrial fibrillation (HCC) (Chronic) POA: Yes    Morbid obesity with BMI of 60.0-69.9, adult ([facility]) POA: Yes    Hypertension (Chronic) POA: Yes    COPD with acute exacerbation (CMS-HCC) POA: Unknown    Diastolic CHF (CMS-HCC) POA: Unknown    Acute hypoxemic respiratory failure (CMS-HCC) POA: Unknown  Resolved Problems:    * No resolved hospital problems. *    Acute respiratory failure is multifactorial with pulmonary edema, underlying COPD, acute and chronic CO2 retention    Today the patient is requesting morphine and comfort care, still awake and coherent, family and friends at bedside. Confirm no interventions, pulmonary will sign off that she appears comfortable on high flow oxygen and BiPAP with narcotic administration

## 2017-12-30 NOTE — DISCHARGE PLANNING
Medical Social Work    Referral: Hospice referral.       Intervention: This writer called pt's sister Joselin who reports no one can find the pt's power of  paperwork. Per Dianne her sister told her that her friend Tricia [phone]  Is her POA for financial and her youngest son Jourdan who is in custodial in [place] . Pt also has a son Eliza who is a . Joselin has no contact information on an family. This met with Katherin friend who wants decision deferred to Tadyady [phone] at bedside, along with niece Saira [phone], and great niece Sharron [phone]. This writer spoke pt's son Eliza who consented for a referral to be sen to [facility]. Pt is on comfort care. Referral sent to [facility]. This writer confirmed with family friends that there is no medical care POA. [facility] hospice paged.      Plan:  Pt will remain on comfort care.

## 2017-12-30 NOTE — PROGRESS NOTES
Chelsea Ojeda Fall Risk Assessment:     Last Known Fall: No falls  Mobility: Use of assistive device/requires assist of two people  Medications: Cardiovascular or central nervous system meds, Diuretics  Mental Status/LOC/Awareness: Oriented to person and place  Toileting Needs: Use of assistive device (Bedside commode, bedpan, urinal), Diarrhea/frequency/urgency  Volume/Electrolyte Status: Low blood sugar/electrolyte imbalances  Communication/Sensory: Visual (Glasses)/hearing deficit  Behavior: Depression/anxiety  Chelsea Ojeda Fall Risk Total: 23  Fall Risk Level: HIGH RISK    Universal Fall Precautions:  call light/belongings in reach, bed in low position and locked, siderails up x 2, use non-slip footwear, adequate lighting, clutter free and spill free environment, educate on level of risk, educate to call for assistance, wheelchairs and assistive devices out of sight    Fall Risk Level Interventions:    TRIAL (TELE 8, NEURO, MED MAAME 5) Moderate Fall Risk Interventions  Place yellow fall risk ID band on patient: verified  Provide patient/family education based on risk assessment : completed  Educate patient/family to call staff for assistance when getting out of bed: completed  Place fall precaution signage outside patient door: verified  Utilize bed/chair fall alarm: verifiedTRIAL (TELE 8, NEURO, MED MAAME 5) High Fall Risk Interventions  Place yellow fall risk ID band on patient: verified  Provide patient/family education based on risk assessment: completed  Educate patient/family to call staff for assistance when getting out of bed: completed  Place fall precaution signage outside patient door: verified  Place patient in room close to nursing station: completed  Utilize bed/chair fall alarm: completed  Notify charge of high risk for huddle: completed    Patient Specific Interventions:     Medication: review medications with patient and family, assess for medications that can be discontinued or dosage  decreased, limit combination of prn medications and provide patients who received diuretics/laxatives frequent toileting  Mental Status/LOC/Awareness: reorient patient, reinforce falls education, encourage family to stay with patient, check on patient hourly, utilize bed/chair fall alarm and reinforce the use of call light  Toileting: provide frquent toileting and monitor intake and output/use of appropriate interventions  Volume/Electrolyte Status: monitor abnormal lab values  Communication/Sensory: update plan of care on whiteboard, ensure proper positioning when transferrng/ambulating and ensure patient has glasses/contacts and hearing aids/dentures  Behavioral: encourage patient to voice feelings, engage patient in daily activities and use appropriate de-escalation techniques  Mobility: schedule physical activity throughout the day, provide comfort measures during transport, utilize bed/chair fall alarm, ensure bed is locked and in lowest position, provide appropriate assistive device, instruct patient to exit bed on their strongest side and collaborate with doctor for possible PT/OT consult

## 2017-12-30 NOTE — PROGRESS NOTES
Assumed care of patient at bedside report from day shift RN. Patient currently resting in bed. No complaints of acute pain. Updated on POC. Call light within reach. Fall precautions in place. Bed locked and in lowest position. No needs indicated at this time.

## 2017-12-30 NOTE — PROGRESS NOTES
Pt removed high flow and is refusing to wear it. O2 saturation mid to low 60's. Suggested exchanging high flow for bipap. Pt amenable to utilizing bipap currently. Paged RT. RT at bedside adjusting bipap. O2 saturation increasing to mid 80's.

## 2017-12-30 NOTE — PROGRESS NOTES
"Sent page out to Dr. Che. Pt refusing all medications except morphine; pt stated she is \"ready to go comfort care.\" Called pt sister, Dianne, because pt requesting family to come be at her bedside. Sister unable to come out from [place], [place]. Pt friend, Tricia, said she will be here at 1030 a.m.  "

## 2017-12-30 NOTE — PROGRESS NOTES
Updated pt sister, Dianne, on initiation of comfort care measures for pt, per Dr. Che.  Sent out a page to  regarding issues with POA and family friend, Tricia.

## 2017-12-30 NOTE — CARE PLAN
Problem: Urinary Elimination:  Goal: Ability to reestablish a normal urinary elimination pattern will improve  Outcome: PROGRESSING SLOWER THAN EXPECTED  Patient expressing urinary urgency but has little to urine output. Will continue to offer bedpan/toileting on a frequent basis.      Problem: Skin Integrity  Goal: Risk for impaired skin integrity will decrease  Outcome: PROGRESSING SLOWER THAN EXPECTED  Patient continues to prefer being supine despite efforts at Q2 turns. Moisture fissure still present on sacrum.

## 2017-12-31 NOTE — DISCHARGE SUMMARY
CHIEF COMPLAINT ON ADMISSION  Chief Complaint   Patient presents with   • Shortness of Breath     onset today.  pt on home O2 at 16l per [place], per EMS report O2 SAT 80% on home O2.  speaking full clear sentences at this time.  reports thick green sputum and was started on oral antibiotics X 3 days without relief       Diagnosis  Endstage COPD  Acute hypoxic respiratory failure  Acute renal failure  Hyperkalemia  Comfort Care DNR  Acute on chronic Diastolic heart failure  Morbid obesity    HPI & HOSPITAL COURSE  This is a 72 y.o. female here with morbid obesity, COPD with chronic hypoxia requiring 8-11 L home oxygen on hospice, admitted for acute on chronic diastolic heart failure. During the course of her stay patient was started on IV steroids for her COPD exacerbation. She was also continued on Lasix diuresis. Patient's renal function was initially at baseline then continued to decline. Patient's respiratory status was labile during this admission. We did have multiple discussions regarding patient's end-stage COPD and requirement for high flow oxygen supplementation. Patient needs increased from 30% high flow up to 50% and the highest at 80%. Patient had discussions with her by mouth a day on the nephrologist and subsequently decided to transition to comfort care DNR. With no further invasive measures. On the morning of , patient's respiratory status had declined with increasing discomfort. Patient did decide to try transition completely to comfort care DNR. Patient had  without further measures.  Patient's stepsister was at bedside during initiation of comfort measures.    Prior to admission, patient was on hospice for COPD. Patient states that she was told by her pulmonologist that she had 5% lung function remaining. Patient stated that she wanted to have an attempt at aggressive intervention including intubate intubation during her stay however throughout the decline in her course patient did  transition to comfort care DNR per prior to expiration.    Therefore, she is discharged in good and stable condition with close outpatient follow-up.        DISCHARGE PROBLEM LIST  Active Problems:    Hyperkalemia POA: Yes    ARF (acute renal failure) (CMS-HCC) POA: Yes    Chronic atrial fibrillation (HCC) (Chronic) POA: Yes    Morbid obesity with BMI of 60.0-69.9, adult ([facility]) POA: Yes    Hypertension (Chronic) POA: Yes    COPD with acute exacerbation (CMS-HCC) POA: Unknown    Diastolic CHF (CMS-HCC) POA: Unknown    Acute hypoxemic respiratory failure (CMS-HCC) POA: Unknown  Resolved Problems:    * No resolved hospital problems. *      CONSULTATIONS  Nephrology, pulmonary,  Palliative care  PROCEDURES  None    LABORATORY  Lab Results   Component Value Date/Time    SODIUM 136 12/30/2017 03:57 AM    POTASSIUM 6.5 (H) 12/30/2017 03:57 AM    CHLORIDE 99 12/30/2017 03:57 AM    CO2 29 12/30/2017 03:57 AM    GLUCOSE 168 (H) 12/30/2017 03:57 AM    BUN 59 (H) 12/30/2017 03:57 AM    CREATININE 2.33 (H) 12/30/2017 03:57 AM    CREATININE 1.19 (H) 12/19/2012 03:43 PM        Lab Results   Component Value Date/Time    WBC 4.2 (L) 12/30/2017 03:57 AM    HEMOGLOBIN 14.3 12/30/2017 03:57 AM    HEMATOCRIT 46.3 12/30/2017 03:57 AM    PLATELETCT 254 12/30/2017 03:57 AM        Total time of the discharge process exceeds 45 minutes

## 2017-12-31 NOTE — PROGRESS NOTES
Assumed care of patient. Patient comfort care on high flow. Plan is to d/c high flow oxygen after her son gets in and spends time with her. Patient is resting comfortably, she is obtunded. No S/S pain. Family at bedside. Whiteboard updated.

## 2025-05-06 NOTE — PROGRESS NOTES
- Phosphorus doing well 4.3 as of 5/2. Not on any binders currently   - calcitriol 0.75mcg TTS, monitor PTH   Chelsea Ojeda Fall Risk Assessment:     Last Known Fall: No falls  Mobility: Use of assistive device/requires assist of two people  Medications: Cardiovascular or central nervous system meds, Diuretics  Mental Status/LOC/Awareness: Awake, alert, and oriented to date, place, and person  Toileting Needs: Use of assistive device (Bedside commode, bedpan, urinal), Diarrhea/frequency/urgency  Volume/Electrolyte Status: Nausea/vomiting  Communication/Sensory: Visual (Glasses)/hearing deficit  Behavior: Depression/anxiety  Chelsea Ojeda Fall Risk Total: 21  Fall Risk Level: HIGH RISK    Universal Fall Precautions:  call light/belongings in reach, bed in low position and locked, wheelchairs and assistive devices out of sight, siderails up x 2, use non-slip footwear, adequate lighting, clutter free and spill free environment, educate on level of risk, educate to call for assistance    Fall Risk Level Interventions:    TRIAL (TELE 8, NEURO, MED MAAME 5) Moderate Fall Risk Interventions  Place yellow fall risk ID band on patient: verified  Provide patient/family education based on risk assessment : completed  Educate patient/family to call staff for assistance when getting out of bed: completed  Place fall precaution signage outside patient door: verified  Utilize bed/chair fall alarm: verifiedTRIAL (TELE 8, NEURO, MED MAAME 5) High Fall Risk Interventions  Place yellow fall risk ID band on patient: verified  Provide patient/family education based on risk assessment: completed  Educate patient/family to call staff for assistance when getting out of bed: completed  Place fall precaution signage outside patient door: verified  Place patient in room close to nursing station: verified  Utilize bed/chair fall alarm: verified  Notify charge of high risk for huddle: completed    Patient Specific Interventions:     Medication: review medications with patient and family  Mental Status/LOC/Awareness: check on patient hourly, utilize  bed/chair fall alarm and reinforce the use of call light  Toileting: provide frquent toileting  Volume/Electrolyte Status: not applicable  Communication/Sensory: update plan of care on whiteboard  Behavioral: encourage patient to voice feelings, administer medication as ordered and instruct/reinforce fall program rationale  Mobility: utilize bed/chair fall alarm, ensure bed is locked and in lowest position and provide appropriate assistive device